# Patient Record
Sex: MALE | Race: WHITE | NOT HISPANIC OR LATINO | Employment: FULL TIME | URBAN - METROPOLITAN AREA
[De-identification: names, ages, dates, MRNs, and addresses within clinical notes are randomized per-mention and may not be internally consistent; named-entity substitution may affect disease eponyms.]

---

## 2023-02-21 ENCOUNTER — OFFICE VISIT (OUTPATIENT)
Dept: INTERNAL MEDICINE | Facility: CLINIC | Age: 23
End: 2023-02-21
Payer: COMMERCIAL

## 2023-02-21 ENCOUNTER — LAB (OUTPATIENT)
Dept: LAB | Facility: HOSPITAL | Age: 23
End: 2023-02-21
Payer: COMMERCIAL

## 2023-02-21 VITALS
HEIGHT: 67 IN | SYSTOLIC BLOOD PRESSURE: 118 MMHG | WEIGHT: 164.2 LBS | DIASTOLIC BLOOD PRESSURE: 80 MMHG | OXYGEN SATURATION: 95 % | TEMPERATURE: 97.7 F | HEART RATE: 55 BPM | BODY MASS INDEX: 25.77 KG/M2

## 2023-02-21 DIAGNOSIS — Z11.59 ENCOUNTER FOR HEPATITIS C SCREENING TEST FOR LOW RISK PATIENT: ICD-10-CM

## 2023-02-21 DIAGNOSIS — Z83.3 FAMILY HISTORY OF DIABETES MELLITUS: ICD-10-CM

## 2023-02-21 DIAGNOSIS — Z13.220 SCREENING FOR LIPID DISORDERS: ICD-10-CM

## 2023-02-21 DIAGNOSIS — N44.00 TESTICULAR TORSION: ICD-10-CM

## 2023-02-21 DIAGNOSIS — Z83.438 FAMILY HISTORY OF HYPERLIPIDEMIA: ICD-10-CM

## 2023-02-21 DIAGNOSIS — Z76.89 ESTABLISHING CARE WITH NEW DOCTOR, ENCOUNTER FOR: ICD-10-CM

## 2023-02-21 DIAGNOSIS — Z13.1 SCREENING FOR DIABETES MELLITUS: ICD-10-CM

## 2023-02-21 DIAGNOSIS — R41.840 DIFFICULTY CONCENTRATING: ICD-10-CM

## 2023-02-21 DIAGNOSIS — F17.220 CHEWING TOBACCO NICOTINE DEPENDENCE WITHOUT COMPLICATION: ICD-10-CM

## 2023-02-21 DIAGNOSIS — Z82.49 FAMILY HISTORY OF ESSENTIAL HYPERTENSION: ICD-10-CM

## 2023-02-21 DIAGNOSIS — Z13.29 SCREENING FOR THYROID DISORDER: ICD-10-CM

## 2023-02-21 DIAGNOSIS — E66.3 OVERWEIGHT: ICD-10-CM

## 2023-02-21 DIAGNOSIS — Z72.0 ENGAGES IN NICOTINE CONTAINING SUBSTANCE VAPING: ICD-10-CM

## 2023-02-21 DIAGNOSIS — Z23 NEED FOR INFLUENZA VACCINATION: ICD-10-CM

## 2023-02-21 DIAGNOSIS — Z71.3 ENCOUNTER FOR DIETARY COUNSELING AND SURVEILLANCE: ICD-10-CM

## 2023-02-21 DIAGNOSIS — F98.8 ATTENTION DEFICIT DISORDER, UNSPECIFIED HYPERACTIVITY PRESENCE: ICD-10-CM

## 2023-02-21 DIAGNOSIS — Z76.89 ESTABLISHING CARE WITH NEW DOCTOR, ENCOUNTER FOR: Primary | ICD-10-CM

## 2023-02-21 DIAGNOSIS — L05.91 PILONIDAL CYST OF NATAL CLEFT: ICD-10-CM

## 2023-02-21 LAB
ALBUMIN SERPL-MCNC: 5.2 G/DL (ref 3.5–5.2)
ALBUMIN/GLOB SERPL: 1.9 G/DL
ALP SERPL-CCNC: 103 U/L (ref 39–117)
ALT SERPL W P-5'-P-CCNC: 23 U/L (ref 1–41)
ANION GAP SERPL CALCULATED.3IONS-SCNC: 11.4 MMOL/L (ref 5–15)
AST SERPL-CCNC: 24 U/L (ref 1–40)
BILIRUB SERPL-MCNC: 1 MG/DL (ref 0–1.2)
BILIRUB UR QL STRIP: NEGATIVE
BUN SERPL-MCNC: 10 MG/DL (ref 6–20)
BUN/CREAT SERPL: 8.8 (ref 7–25)
CALCIUM SPEC-SCNC: 9.8 MG/DL (ref 8.6–10.5)
CHLORIDE SERPL-SCNC: 103 MMOL/L (ref 98–107)
CHOLEST SERPL-MCNC: 145 MG/DL (ref 0–200)
CLARITY UR: CLEAR
CO2 SERPL-SCNC: 24.6 MMOL/L (ref 22–29)
COLOR UR: YELLOW
CREAT SERPL-MCNC: 1.13 MG/DL (ref 0.76–1.27)
DEPRECATED RDW RBC AUTO: 39.1 FL (ref 37–54)
EGFRCR SERPLBLD CKD-EPI 2021: 93.1 ML/MIN/1.73
ERYTHROCYTE [DISTWIDTH] IN BLOOD BY AUTOMATED COUNT: 11.8 % (ref 12.3–15.4)
GLOBULIN UR ELPH-MCNC: 2.7 GM/DL
GLUCOSE SERPL-MCNC: 81 MG/DL (ref 65–99)
GLUCOSE UR STRIP-MCNC: NEGATIVE MG/DL
HBA1C MFR BLD: 5 % (ref 4.8–5.6)
HCT VFR BLD AUTO: 44.8 % (ref 37.5–51)
HDLC SERPL-MCNC: 60 MG/DL (ref 40–60)
HGB BLD-MCNC: 15.2 G/DL (ref 13–17.7)
HGB UR QL STRIP.AUTO: NEGATIVE
KETONES UR QL STRIP: NEGATIVE
LDLC SERPL CALC-MCNC: 73 MG/DL (ref 0–100)
LDLC/HDLC SERPL: 1.23 {RATIO}
LEUKOCYTE ESTERASE UR QL STRIP.AUTO: NEGATIVE
MCH RBC QN AUTO: 30.9 PG (ref 26.6–33)
MCHC RBC AUTO-ENTMCNC: 33.9 G/DL (ref 31.5–35.7)
MCV RBC AUTO: 91.1 FL (ref 79–97)
NITRITE UR QL STRIP: NEGATIVE
PH UR STRIP.AUTO: 6.5 [PH] (ref 5–8)
PLATELET # BLD AUTO: 244 10*3/MM3 (ref 140–450)
PMV BLD AUTO: 10.4 FL (ref 6–12)
POTASSIUM SERPL-SCNC: 4.2 MMOL/L (ref 3.5–5.2)
PROT SERPL-MCNC: 7.9 G/DL (ref 6–8.5)
PROT UR QL STRIP: NEGATIVE
RBC # BLD AUTO: 4.92 10*6/MM3 (ref 4.14–5.8)
SODIUM SERPL-SCNC: 139 MMOL/L (ref 136–145)
SP GR UR STRIP: 1.02 (ref 1–1.03)
TRIGL SERPL-MCNC: 57 MG/DL (ref 0–150)
TSH SERPL DL<=0.05 MIU/L-ACNC: 1.18 UIU/ML (ref 0.27–4.2)
UROBILINOGEN UR QL STRIP: NORMAL
VLDLC SERPL-MCNC: 12 MG/DL (ref 5–40)
WBC NRBC COR # BLD: 5.81 10*3/MM3 (ref 3.4–10.8)

## 2023-02-21 PROCEDURE — 90471 IMMUNIZATION ADMIN: CPT | Performed by: NURSE PRACTITIONER

## 2023-02-21 PROCEDURE — 83036 HEMOGLOBIN GLYCOSYLATED A1C: CPT

## 2023-02-21 PROCEDURE — 80050 GENERAL HEALTH PANEL: CPT

## 2023-02-21 PROCEDURE — 86708 HEPATITIS A ANTIBODY: CPT

## 2023-02-21 PROCEDURE — 86709 HEPATITIS A IGM ANTIBODY: CPT

## 2023-02-21 PROCEDURE — 86803 HEPATITIS C AB TEST: CPT

## 2023-02-21 PROCEDURE — 86704 HEP B CORE ANTIBODY TOTAL: CPT

## 2023-02-21 PROCEDURE — 96127 BRIEF EMOTIONAL/BEHAV ASSMT: CPT | Performed by: NURSE PRACTITIONER

## 2023-02-21 PROCEDURE — 87340 HEPATITIS B SURFACE AG IA: CPT

## 2023-02-21 PROCEDURE — 81003 URINALYSIS AUTO W/O SCOPE: CPT

## 2023-02-21 PROCEDURE — 90686 IIV4 VACC NO PRSV 0.5 ML IM: CPT | Performed by: NURSE PRACTITIONER

## 2023-02-21 PROCEDURE — 99204 OFFICE O/P NEW MOD 45 MIN: CPT | Performed by: NURSE PRACTITIONER

## 2023-02-21 PROCEDURE — 80061 LIPID PANEL: CPT

## 2023-02-21 PROCEDURE — 86706 HEP B SURFACE ANTIBODY: CPT

## 2023-02-21 NOTE — PROGRESS NOTES
Office Note     Name: Lucy Bahena    : 2000     MRN: [id number]     Chief Complaint  Cyst and Establish Care    Subjective     History of Present Illness:  Lucy Bahena is a 23 y.o. male who presents today for establish care with new provider and concerns for ADD and pilonidal cyst    Patient is accompanied by his mother today during visit    Patient would appreciate a referral for his diagnosis of ADD.  He states that in the past he has been on medication for this and does have a previous diagnosis.  He did have to stop the medication as he did enter into the .  Mother does have a psychiatric records and can provide those as we need them.  He does report that he has trouble remembering a conversation that he would have with someone but will remember a few days later.  He does have difficulty with concentrating.  Denies that he has issues with task completion    Patient does have a history of a pilonidal cyst.  It does appear in our records that back in 2018 he was evaluated and treated for this.  He reports he has had this condition for about 6 years.  He has never had surgical operation on this.  It is mainly aggravated by movement.  He was supposed to have surgery to remove the pilonidal cyst but even in the OR the doctor decided not to do the surgery as the doctor stated she could not see it.  Denies any leakage at this time.  No drainage.  No redness.  He does report that there is a pinpoint hole in the area.  His mother has seen it as well and does report similar findings    Patient reports a history of testicular torsion.  They were able to save his testicle.  This was in     Significant family history includes hypertension, hyperlipidemia, diabetes    Patient states he has been fully vaccinated for COVID.  He does not have his COVID card with him but he will provide us this documentation    He is interested in the tetanus and flu shot    Patient does not follow any particular  "diet or exercise pattern but he is very active at his job as he is a     Patient denies any alcohol use.  Occasional marijuana use.  Patient does vape nicotine-based liquid.  It is nondisposable and will last him for months.  Patient also chews tobacco.  Again this is occasional and 1 can will last him a few months.    Review of Systems   Constitutional: Negative for fatigue and fever.   Respiratory: Negative for cough.    Cardiovascular: Negative for chest pain.   Musculoskeletal: Negative for arthralgias.   Skin: Positive for wound.   Allergic/Immunologic: Negative for immunocompromised state.   Neurological: Negative for headaches.   Psychiatric/Behavioral: Positive for decreased concentration. Negative for suicidal ideas. The patient is not nervous/anxious.        Past Medical History:   Diagnosis Date   • ADHD (attention deficit hyperactivity disorder)    • HL (hearing loss)        Past Surgical History:   Procedure Laterality Date   • TESTICLE TORSION REPAIR         Social History     Socioeconomic History   • Marital status: Single   Tobacco Use   • Smoking status: Never   • Smokeless tobacco: Current     Types: Chew   Substance and Sexual Activity   • Alcohol use: No   • Drug use: Never   • Sexual activity: Yes     Partners: Female     Birth control/protection: Condom       No current outpatient medications on file.    Objective     Vital Signs  /80   Pulse 55   Temp 97.7 °F (36.5 °C)   Ht 170.2 cm (67\")   Wt 74.5 kg (164 lb 3.2 oz)   SpO2 95%   BMI 25.72 kg/m²   Estimated body mass index is 25.72 kg/m² as calculated from the following:    Height as of this encounter: 170.2 cm (67\").    Weight as of this encounter: 74.5 kg (164 lb 3.2 oz).    BMI is >= 25 and <30. (Overweight) The following options were offered after discussion;: exercise counseling/recommendations and nutrition counseling/recommendations      PHQ-9 Depression Screening  Little interest or pleasure in doing things? 0-->not " at all   Feeling down, depressed, or hopeless? 0-->not at all   Trouble falling or staying asleep, or sleeping too much?     Feeling tired or having little energy?     Poor appetite or overeating?     Feeling bad about yourself - or that you are a failure or have let yourself or your family down?     Trouble concentrating on things, such as reading the newspaper or watching television?     Moving or speaking so slowly that other people could have noticed? Or the opposite - being so fidgety or restless that you have been moving around a lot more than usual?     Thoughts that you would be better off dead, or of hurting yourself in some way?     PHQ-9 Total Score 0   If you checked off any problems, how difficult have these problems made it for you to do your work, take care of things at home, or get along with other people?       PHQ-9 Total Score: 0    KARRIE-7  Feeling nervous, anxious or on edge: Not at all  Not being able to stop or control worrying: Not at all  Worrying too much about different things: Not at all  Trouble Relaxing: Not at all  Being so restless that it is hard to sit still: Not at all  Feeling afraid as if something awful might happen: Not at all  Becoming easily annoyed or irritable: Several days  KARRIE 7 Total Score: 1    Physical Exam  Vitals and nursing note reviewed.   Constitutional:       Appearance: Normal appearance.   HENT:      Head: Normocephalic and atraumatic.   Eyes:      Extraocular Movements: Extraocular movements intact.      Pupils: Pupils are equal, round, and reactive to light.   Cardiovascular:      Rate and Rhythm: Normal rate and regular rhythm.      Pulses: Normal pulses.           Radial pulses are 2+ on the right side and 2+ on the left side.        Posterior tibial pulses are 2+ on the right side and 2+ on the left side.      Heart sounds: Normal heart sounds, S1 normal and S2 normal.   Pulmonary:      Effort: Pulmonary effort is normal.      Breath sounds: Normal breath  sounds.   Abdominal:      General: Bowel sounds are normal.      Palpations: Abdomen is soft.   Musculoskeletal:         General: Normal range of motion.   Skin:     General: Skin is warm and dry.          Neurological:      Mental Status: He is alert and oriented to person, place, and time.   Psychiatric:         Mood and Affect: Mood normal.         Behavior: Behavior normal.         Thought Content: Thought content normal.         Judgment: Judgment normal.                 Assessment and Plan     Diagnoses and all orders for this visit:    1. Establishing care with new doctor, encounter for (Primary)  -     CBC (No Diff); Future  -     Comprehensive Metabolic Panel; Future  -     Urinalysis With Culture If Indicated -; Future    2. Attention deficit disorder, unspecified hyperactivity presence  -     Ambulatory Referral to Behavioral Health    3. Difficulty concentrating    4. Pilonidal cyst of  cleft  -     Ambulatory Referral to General Surgery    5. Testicular torsion    6. Screening for lipid disorders  -     Lipid Panel; Future    7. Screening for diabetes mellitus  -     Hemoglobin A1c; Future    8. Overweight  -     Hemoglobin A1c; Future    9. BMI 25.0-25.9,adult  -     Hemoglobin A1c; Future    10. Encounter for dietary counseling and surveillance    11. Screening for thyroid disorder  -     TSH Rfx On Abnormal To Free T4; Future    12. Encounter for hepatitis C screening test for low risk patient  -     Viral Hepatitis Screening and Diagnosis (HAV, HBV, HCV); Future    13. Need for influenza vaccination  -     FluLaval/Fluarix/Fluzone >6 Months    14. Family history of essential hypertension    15. Family history of hyperlipidemia    16. Family history of diabetes mellitus    17. Engages in nicotine containing substance vaping    18. Chewing tobacco nicotine dependence without complication    Plan  Regarding the evaluation for your ADD and difficulty with concentration, we will place a referral to  behavioral health.  This will be virtual and based out of [place].  You will receive a phone call with a date time and location for your first appointment    Please make sure to provide us with your psychiatric records from your first diagnosis of ADD    Regarding the pilonidal cyst, will place a referral to general surgery for further evaluation.  You will be called with a date time and location    Labs will be ordered and obtained today.  Will be notified of results    Patient will receive the flu shot today    Please provide us a copy of your COVID card so that we can document appropriately    Continue with healthy lifestyle    Consider vaping and chewing tobacco cessation    Go to ER if any condition worsens or severe    We will plan to follow-up in 4 weeks to review labs further, check on the referrals to behavioral health and surgery, tetanus shot    Follow Up  Return for 4 weeks for lab review and referral follow up, tetanus shot.    CHRIS Catalan    Part of this note may be an electronic transcription/translation of spoken language to printed text using the Dragon Dictation System.

## 2023-02-22 ENCOUNTER — TELEPHONE (OUTPATIENT)
Dept: INTERNAL MEDICINE | Facility: CLINIC | Age: 23
End: 2023-02-22
Payer: COMMERCIAL

## 2023-02-22 NOTE — TELEPHONE ENCOUNTER
----- Message from CHRIS Catalan sent at 2/22/2023  8:41 AM EST -----  Please let patient know that labs resulted.  We are only waiting on the hepatitis panel.  Overall your labs look great!  We will still plan to follow-up March 21

## 2023-02-23 ENCOUNTER — TELEPHONE (OUTPATIENT)
Dept: INTERNAL MEDICINE | Facility: CLINIC | Age: 23
End: 2023-02-23
Payer: COMMERCIAL

## 2023-02-23 LAB
HAV AB SER QL IA: POSITIVE
HAV IGM SERPL QL IA: NEGATIVE
HBV CORE AB SERPL QL IA: NEGATIVE
HBV SURFACE AB SER QL: REACTIVE
HBV SURFACE AG SERPL QL IA: NEGATIVE
HCV AB SERPL QL IA: NORMAL
HCV IGG SERPL QL IA: NON REACTIVE
IMP & REVIEW OF LAB RESULTS: ABNORMAL
LABORATORY COMMENT REPORT: ABNORMAL

## 2023-02-24 ENCOUNTER — TELEPHONE (OUTPATIENT)
Dept: INTERNAL MEDICINE | Facility: CLINIC | Age: 23
End: 2023-02-24
Payer: COMMERCIAL

## 2023-02-24 ENCOUNTER — PATIENT ROUNDING (BHMG ONLY) (OUTPATIENT)
Dept: INTERNAL MEDICINE | Facility: CLINIC | Age: 23
End: 2023-02-24
Payer: COMMERCIAL

## 2023-02-24 NOTE — TELEPHONE ENCOUNTER
----- Message from CHRIS Catalan sent at 2/23/2023 12:00 PM EST -----  Please let patient know that hepatitis panel returned.  Hepatitis A antibody is positive but IgM is negative.  This means 1 of 2 things.  You have either had this condition in the past or been vaccinated for this condition.  Hepatitis A was big in our area a few years ago with multiple vaccine clinics.  It is very similar to a GI bug.  You may not have even known you at high  Your hepatitis B profile does show that you have immunity via vaccination  Hepatitis C is negative

## 2023-03-21 ENCOUNTER — OFFICE VISIT (OUTPATIENT)
Dept: INTERNAL MEDICINE | Facility: CLINIC | Age: 23
End: 2023-03-21
Payer: COMMERCIAL

## 2023-03-21 VITALS
BODY MASS INDEX: 25.11 KG/M2 | HEIGHT: 67 IN | HEART RATE: 57 BPM | OXYGEN SATURATION: 99 % | WEIGHT: 160 LBS | SYSTOLIC BLOOD PRESSURE: 132 MMHG | DIASTOLIC BLOOD PRESSURE: 80 MMHG | TEMPERATURE: 96.9 F

## 2023-03-21 DIAGNOSIS — F98.8 ATTENTION DEFICIT DISORDER, UNSPECIFIED HYPERACTIVITY PRESENCE: ICD-10-CM

## 2023-03-21 DIAGNOSIS — J30.89 SEASONAL ALLERGIC RHINITIS DUE TO OTHER ALLERGIC TRIGGER: ICD-10-CM

## 2023-03-21 DIAGNOSIS — Z01.89 ENCOUNTER FOR LABORATORY TEST: Primary | ICD-10-CM

## 2023-03-21 DIAGNOSIS — Z28.21 IMMUNIZATION DECLINED: ICD-10-CM

## 2023-03-21 DIAGNOSIS — L05.91 PILONIDAL CYST OF NATAL CLEFT: ICD-10-CM

## 2023-03-21 DIAGNOSIS — R41.840 DIFFICULTY CONCENTRATING: ICD-10-CM

## 2023-03-21 RX ORDER — DIPHENHYDRAMINE HCL 25 MG
25 TABLET ORAL NIGHTLY PRN
Qty: 30 TABLET | Refills: 0 | Status: SHIPPED | OUTPATIENT
Start: 2023-03-21 | End: 2023-04-20

## 2023-03-21 RX ORDER — CETIRIZINE HYDROCHLORIDE 10 MG/1
10 TABLET ORAL DAILY
Qty: 30 TABLET | Refills: 0 | Status: SHIPPED | OUTPATIENT
Start: 2023-03-21 | End: 2023-04-20

## 2023-03-21 RX ORDER — ALBUTEROL SULFATE 90 UG/1
2 AEROSOL, METERED RESPIRATORY (INHALATION) EVERY 4 HOURS PRN
Qty: 18 G | Refills: 0 | Status: SHIPPED | OUTPATIENT
Start: 2023-03-21 | End: 2023-04-20

## 2023-03-21 RX ORDER — FLUTICASONE PROPIONATE 50 MCG
2 SPRAY, SUSPENSION (ML) NASAL DAILY
Qty: 18.2 ML | Refills: 0 | Status: SHIPPED | OUTPATIENT
Start: 2023-03-21 | End: 2023-04-20
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
## 2023-05-16 ENCOUNTER — OFFICE VISIT (OUTPATIENT)
Dept: INTERNAL MEDICINE | Facility: CLINIC | Age: 23
End: 2023-05-16
Payer: COMMERCIAL

## 2023-05-16 VITALS
BODY MASS INDEX: 24.96 KG/M2 | OXYGEN SATURATION: 99 % | HEIGHT: 67 IN | DIASTOLIC BLOOD PRESSURE: 72 MMHG | HEART RATE: 52 BPM | WEIGHT: 159 LBS | SYSTOLIC BLOOD PRESSURE: 128 MMHG

## 2023-05-16 DIAGNOSIS — L05.91 PILONIDAL CYST OF NATAL CLEFT: ICD-10-CM

## 2023-05-16 DIAGNOSIS — Z00.00 ANNUAL PHYSICAL EXAM: Primary | ICD-10-CM

## 2023-05-16 DIAGNOSIS — Z71.3 ENCOUNTER FOR DIETARY COUNSELING AND SURVEILLANCE: ICD-10-CM

## 2023-05-16 DIAGNOSIS — N44.00 TESTICULAR TORSION: ICD-10-CM

## 2023-05-16 DIAGNOSIS — Z83.3 FAMILY HISTORY OF DIABETES MELLITUS: ICD-10-CM

## 2023-05-16 DIAGNOSIS — Z82.49 FAMILY HISTORY OF ESSENTIAL HYPERTENSION: ICD-10-CM

## 2023-05-16 DIAGNOSIS — F98.8 ATTENTION DEFICIT DISORDER, UNSPECIFIED HYPERACTIVITY PRESENCE: ICD-10-CM

## 2023-05-16 DIAGNOSIS — Z00.00 ENCOUNTER FOR WELL ADULT EXAM WITHOUT ABNORMAL FINDINGS: ICD-10-CM

## 2023-05-16 DIAGNOSIS — Z83.438 FAMILY HISTORY OF HYPERLIPIDEMIA: ICD-10-CM

## 2023-05-16 DIAGNOSIS — Z72.0 ENGAGES IN NICOTINE CONTAINING SUBSTANCE VAPING: ICD-10-CM

## 2023-05-16 DIAGNOSIS — R41.840 DIFFICULTY CONCENTRATING: ICD-10-CM

## 2023-05-16 DIAGNOSIS — Z23 ENCOUNTER FOR IMMUNIZATION: ICD-10-CM

## 2023-05-16 PROBLEM — F17.220 CHEWING TOBACCO NICOTINE DEPENDENCE WITHOUT COMPLICATION: Status: RESOLVED | Noted: 2023-02-21 | Resolved: 2023-05-16

## 2023-05-16 NOTE — PROGRESS NOTES
Office Note     Name: Lucy Bahena    : 2000     MRN: [id number]     Chief Complaint  Annual Exam    Subjective     History of Present Illness:  Lucy Bahena is a 23 y.o. male who presents today for annual physical exam    Patient establish care back in 2023    Patient does have a history of ADD.  He was referred to behavioral health but declined at this time.  He would like to keep the referral in the system just in case he would like to pursue in the future.  He does note difficulty with concentration.  Denies issues with task completion.  He does not want to be on medication at this time    Patient has a history of a pilonidal cyst.  At previous visit he was referred to a surgeon.  Records are noted in the patient's chart.  Denies any issues at this time.    Patient does have a history of testicular torsion in .  They were able to save his testicle    Significant family history does include hypertension, hyperlipidemia, diabetes    PHQ 2 was completed today with a score of 0    No falls or ER visits in the last year    Patient no longer chews tobacco.  He intermittently vapes nicotine-based liquid.  No excessive alcohol intake.  No excessive drug use    He does wear his seatbelts in the car and has smoke detectors at home    [facility]  He did let me know he has been diagnosed as 50% disabled.  He does have an additional upcoming appointment as well    The patient is being seen for a health maintenance evaluation.    Past Medical History:   Diagnosis Date   • ADHD (attention deficit hyperactivity disorder)    • Chewing tobacco nicotine dependence without complication 2023   • HL (hearing loss)        Past Surgical History:   Procedure Laterality Date   • TESTICLE TORSION REPAIR         Social History     Socioeconomic History   • Marital status: Single   Tobacco Use   • Smoking status: Never   • Smokeless tobacco:  "Current     Types: Chew   Substance and Sexual Activity   • Alcohol use: No   • Drug use: Never   • Sexual activity: Yes     Partners: Female     Birth control/protection: Condom         Current Outpatient Medications:   •  cetirizine (zyrTEC) 10 MG tablet, Take 1 tablet by mouth Daily for 30 days., Disp: 30 tablet, Rfl: 0  •  fluticasone (FLONASE) 50 MCG/ACT nasal spray, 2 sprays into the nostril(s) as directed by provider Daily for 30 days., Disp: 18.2 mL, Rfl: 0    General History  Lucy  does have regular dental visits.  He does not complain of vision problems. Last eye exam was none recent  Immunizations are up to date. The patient needs the following immunizations: He did provide a copy of his COVID card today.  Patient did receive his tetanus shot today as well    Lifestyle  Lucy  consumes a in general, a \"healthy\" diet  .  He exercises daily.    Reproductive Health  Lucy  is sexually active. His contraceptive plan is no method.   He does not have erectile dysfunction.     Screening  Last PSA was never.  Last prostate exam was never. Family history of prostate cancer: None stated  Last testicular exam was none recent     Last colonoscopy was never  Last Completed Colonoscopy     This patient has no relevant Health Maintenance data.      . Family history of colon cancer: None stated    Other pertinent family history and/or screenings: None at this time.    Advance Care Planning   ACP discussion was held with the patient during this visit. Patient does not have an advance directive, declines further assistance.           Objective     Vital Signs  /72   Pulse 52   Ht 170.2 cm (67\")   Wt 72.1 kg (159 lb)   SpO2 99%   BMI 24.90 kg/m²   Estimated body mass index is 24.9 kg/m² as calculated from the following:    Height as of this encounter: 170.2 cm (67\").    Weight as of this encounter: 72.1 kg (159 lb).    BMI is within normal parameters. No other follow-up for BMI required.      PHQ-9 Depression " Screening  Little interest or pleasure in doing things? 0-->not at all   Feeling down, depressed, or hopeless? 0-->not at all   Trouble falling or staying asleep, or sleeping too much?     Feeling tired or having little energy?     Poor appetite or overeating?     Feeling bad about yourself - or that you are a failure or have let yourself or your family down?     Trouble concentrating on things, such as reading the newspaper or watching television?     Moving or speaking so slowly that other people could have noticed? Or the opposite - being so fidgety or restless that you have been moving around a lot more than usual?     Thoughts that you would be better off dead, or of hurting yourself in some way?     PHQ-9 Total Score 0   If you checked off any problems, how difficult have these problems made it for you to do your work, take care of things at home, or get along with other people?       PHQ-9 Total Score: 0           Physical Exam  Vitals and nursing note reviewed.   Constitutional:       General: He is awake.      Appearance: Normal appearance. He is well-groomed.   HENT:      Head: Normocephalic and atraumatic.      Right Ear: Hearing and external ear normal.      Left Ear: Hearing and external ear normal.      Nose: Nose normal.      Mouth/Throat:      Lips: Pink.      Mouth: Mucous membranes are moist.   Eyes:      Extraocular Movements: Extraocular movements intact.      Pupils: Pupils are equal, round, and reactive to light.   Cardiovascular:      Rate and Rhythm: Normal rate and regular rhythm.      Pulses: Normal pulses.           Radial pulses are 2+ on the right side and 2+ on the left side.      Heart sounds: Normal heart sounds, S1 normal and S2 normal.   Pulmonary:      Effort: Pulmonary effort is normal.      Breath sounds: Normal breath sounds.   Abdominal:      General: Bowel sounds are normal.      Palpations: Abdomen is soft.   Musculoskeletal:         General: Normal range of motion.   Skin:      General: Skin is warm and dry.   Neurological:      Mental Status: He is alert and oriented to person, place, and time.   Psychiatric:         Mood and Affect: Mood normal.         Behavior: Behavior normal. Behavior is cooperative.         Thought Content: Thought content normal.         Judgment: Judgment normal.                 Assessment and Plan     Diagnoses and all orders for this visit:    1. Annual physical exam (Primary)    2. Encounter for well adult exam without abnormal findings    3. Attention deficit disorder, unspecified hyperactivity presence    4. Difficulty concentrating    5. Pilonidal cyst of  cleft    6. Testicular torsion    7. Family history of essential hypertension    8. Family history of hyperlipidemia    9. Family history of diabetes mellitus    10. Engages in nicotine containing substance vaping    11. BMI 24.0-24.9, adult    12. Encounter for dietary counseling and surveillance    13. Encounter for immunization    Other orders  -     Tdap Vaccine Greater Than or Equal To 6yo IM    Plan  Patient completed annual physical exam today    We will hold on labs at this time as they were obtained at previous visit and within normal limits.    Continue with healthy diet and exercise    Consider vaping cessation    Consider completing advanced directive    Tetanus shot was provided today.  Patient declined any further COVID vaccinations    Go to ER if any condition worsens or severe    He did not need refills today    Patient does feel comfortable waiting 1 year for next annual visit.  He is welcome to return as needed if acute needs arise.  Will obtain labs at next visit in 1 year    Follow Up  Return for 1 year for annual visit and labs.    CHRIS Catalan      Part of this note may be an electronic transcription/translation of spoken language to printed text using the Dragon Dictation System.

## 2024-01-15 ENCOUNTER — HOSPITAL ENCOUNTER (OUTPATIENT)
Facility: HOSPITAL | Age: 24
Setting detail: SURGERY ADMIT
End: 2024-01-15
Attending: UROLOGY | Admitting: UROLOGY
Payer: COMMERCIAL

## 2024-01-15 ENCOUNTER — APPOINTMENT (OUTPATIENT)
Dept: GENERAL RADIOLOGY | Facility: HOSPITAL | Age: 24
End: 2024-01-15
Payer: COMMERCIAL

## 2024-01-15 ENCOUNTER — ANESTHESIA (OUTPATIENT)
Dept: PERIOP | Facility: HOSPITAL | Age: 24
End: 2024-01-15
Payer: COMMERCIAL

## 2024-01-15 ENCOUNTER — APPOINTMENT (OUTPATIENT)
Dept: CT IMAGING | Facility: HOSPITAL | Age: 24
End: 2024-01-15
Payer: COMMERCIAL

## 2024-01-15 ENCOUNTER — READMISSION MANAGEMENT (OUTPATIENT)
Dept: CALL CENTER | Facility: HOSPITAL | Age: 24
End: 2024-01-15
Payer: COMMERCIAL

## 2024-01-15 ENCOUNTER — HOSPITAL ENCOUNTER (INPATIENT)
Facility: HOSPITAL | Age: 24
LOS: 1 days | Discharge: HOME | End: 2024-01-15
Attending: EMERGENCY MEDICINE | Admitting: HOSPITALIST
Payer: COMMERCIAL

## 2024-01-15 ENCOUNTER — ANESTHESIA EVENT (OUTPATIENT)
Dept: PERIOP | Facility: HOSPITAL | Age: 24
End: 2024-01-15
Payer: COMMERCIAL

## 2024-01-15 VITALS
HEIGHT: 67 IN | RESPIRATION RATE: 16 BRPM | WEIGHT: 157 LBS | OXYGEN SATURATION: 96 % | HEART RATE: 69 BPM | SYSTOLIC BLOOD PRESSURE: 142 MMHG | BODY MASS INDEX: 24.64 KG/M2 | TEMPERATURE: 97.5 F | DIASTOLIC BLOOD PRESSURE: 81 MMHG

## 2024-01-15 DIAGNOSIS — N20.0 BILATERAL KIDNEY STONES: Primary | ICD-10-CM

## 2024-01-15 DIAGNOSIS — N13.2 URETERAL STONE WITH HYDRONEPHROSIS: Primary | ICD-10-CM

## 2024-01-15 DIAGNOSIS — R31.9 HEMATURIA, UNSPECIFIED TYPE: ICD-10-CM

## 2024-01-15 DIAGNOSIS — N20.0 KIDNEY STONES: ICD-10-CM

## 2024-01-15 DIAGNOSIS — R10.9 ACUTE FLANK PAIN: ICD-10-CM

## 2024-01-15 LAB
ALBUMIN SERPL-MCNC: 4.7 G/DL (ref 3.5–5.2)
ALBUMIN/GLOB SERPL: 1.6 G/DL
ALP SERPL-CCNC: 97 U/L (ref 39–117)
ALT SERPL W P-5'-P-CCNC: 16 U/L (ref 1–41)
ANION GAP SERPL CALCULATED.3IONS-SCNC: 13 MMOL/L (ref 5–15)
AST SERPL-CCNC: 22 U/L (ref 1–40)
BACTERIA UR QL AUTO: ABNORMAL /HPF
BASOPHILS # BLD AUTO: 0.05 10*3/MM3 (ref 0–0.2)
BASOPHILS NFR BLD AUTO: 0.5 % (ref 0–1.5)
BILIRUB SERPL-MCNC: 0.6 MG/DL (ref 0–1.2)
BILIRUB UR QL STRIP: NEGATIVE
BUN SERPL-MCNC: 14 MG/DL (ref 6–20)
BUN/CREAT SERPL: 11.2 (ref 7–25)
CALCIUM SPEC-SCNC: 9 MG/DL (ref 8.6–10.5)
CHLORIDE SERPL-SCNC: 103 MMOL/L (ref 98–107)
CLARITY UR: CLEAR
CO2 SERPL-SCNC: 25 MMOL/L (ref 22–29)
COLOR UR: YELLOW
CREAT SERPL-MCNC: 1.25 MG/DL (ref 0.76–1.27)
DEPRECATED RDW RBC AUTO: 40.2 FL (ref 37–54)
EGFRCR SERPLBLD CKD-EPI 2021: 82.5 ML/MIN/1.73
EOSINOPHIL # BLD AUTO: 0.1 10*3/MM3 (ref 0–0.4)
EOSINOPHIL NFR BLD AUTO: 0.9 % (ref 0.3–6.2)
ERYTHROCYTE [DISTWIDTH] IN BLOOD BY AUTOMATED COUNT: 11.8 % (ref 12.3–15.4)
GLOBULIN UR ELPH-MCNC: 2.9 GM/DL
GLUCOSE SERPL-MCNC: 117 MG/DL (ref 65–99)
GLUCOSE UR STRIP-MCNC: NEGATIVE MG/DL
HCT VFR BLD AUTO: 44.8 % (ref 37.5–51)
HGB BLD-MCNC: 15 G/DL (ref 13–17.7)
HGB UR QL STRIP.AUTO: ABNORMAL
HOLD SPECIMEN: NORMAL
HYALINE CASTS UR QL AUTO: ABNORMAL /LPF
IMM GRANULOCYTES # BLD AUTO: 0.04 10*3/MM3 (ref 0–0.05)
IMM GRANULOCYTES NFR BLD AUTO: 0.4 % (ref 0–0.5)
KETONES UR QL STRIP: NEGATIVE
LEUKOCYTE ESTERASE UR QL STRIP.AUTO: NEGATIVE
LIPASE SERPL-CCNC: 37 U/L (ref 13–60)
LIPASE SERPL-CCNC: 37 U/L (ref 13–60)
LYMPHOCYTES # BLD AUTO: 1.07 10*3/MM3 (ref 0.7–3.1)
LYMPHOCYTES NFR BLD AUTO: 9.8 % (ref 19.6–45.3)
MCH RBC QN AUTO: 31.4 PG (ref 26.6–33)
MCHC RBC AUTO-ENTMCNC: 33.5 G/DL (ref 31.5–35.7)
MCV RBC AUTO: 93.9 FL (ref 79–97)
MONOCYTES # BLD AUTO: 0.71 10*3/MM3 (ref 0.1–0.9)
MONOCYTES NFR BLD AUTO: 6.5 % (ref 5–12)
NEUTROPHILS NFR BLD AUTO: 8.99 10*3/MM3 (ref 1.7–7)
NEUTROPHILS NFR BLD AUTO: 81.9 % (ref 42.7–76)
NITRITE UR QL STRIP: NEGATIVE
NRBC BLD AUTO-RTO: 0 /100 WBC (ref 0–0.2)
PH UR STRIP.AUTO: <=5 [PH] (ref 5–8)
PLATELET # BLD AUTO: 226 10*3/MM3 (ref 140–450)
PMV BLD AUTO: 10.1 FL (ref 6–12)
POTASSIUM SERPL-SCNC: 4 MMOL/L (ref 3.5–5.2)
PROT SERPL-MCNC: 7.6 G/DL (ref 6–8.5)
PROT UR QL STRIP: NEGATIVE
RBC # BLD AUTO: 4.77 10*6/MM3 (ref 4.14–5.8)
RBC # UR STRIP: ABNORMAL /HPF
REF LAB TEST METHOD: ABNORMAL
SODIUM SERPL-SCNC: 141 MMOL/L (ref 136–145)
SP GR UR STRIP: 1.01 (ref 1–1.03)
SQUAMOUS #/AREA URNS HPF: ABNORMAL /HPF
UROBILINOGEN UR QL STRIP: ABNORMAL
WBC # UR STRIP: ABNORMAL /HPF
WBC NRBC COR # BLD AUTO: 10.96 10*3/MM3 (ref 3.4–10.8)
WHOLE BLOOD HOLD COAG: NORMAL
WHOLE BLOOD HOLD SPECIMEN: NORMAL

## 2024-01-15 PROCEDURE — 76000 FLUOROSCOPY <1 HR PHYS/QHP: CPT

## 2024-01-15 PROCEDURE — C2617 STENT, NON-COR, TEM W/O DEL: HCPCS | Performed by: UROLOGY

## 2024-01-15 PROCEDURE — 83690 ASSAY OF LIPASE: CPT

## 2024-01-15 PROCEDURE — 52351 CYSTOURETERO & OR PYELOSCOPE: CPT | Performed by: UROLOGY

## 2024-01-15 PROCEDURE — 25010000002 ONDANSETRON PER 1 MG: Performed by: NURSE ANESTHETIST, CERTIFIED REGISTERED

## 2024-01-15 PROCEDURE — 85025 COMPLETE CBC W/AUTO DIFF WBC: CPT

## 2024-01-15 PROCEDURE — 74420 UROGRAPHY RTRGR +-KUB: CPT | Performed by: UROLOGY

## 2024-01-15 PROCEDURE — C1758 CATHETER, URETERAL: HCPCS | Performed by: UROLOGY

## 2024-01-15 PROCEDURE — 25010000002 PROPOFOL 10 MG/ML EMULSION: Performed by: NURSE ANESTHETIST, CERTIFIED REGISTERED

## 2024-01-15 PROCEDURE — 99024 POSTOP FOLLOW-UP VISIT: CPT | Performed by: UROLOGY

## 2024-01-15 PROCEDURE — 25010000002 KETOROLAC TROMETHAMINE PER 15 MG: Performed by: NURSE ANESTHETIST, CERTIFIED REGISTERED

## 2024-01-15 PROCEDURE — 25010000002 DEXAMETHASONE PER 1 MG: Performed by: NURSE ANESTHETIST, CERTIFIED REGISTERED

## 2024-01-15 PROCEDURE — 25810000003 LACTATED RINGERS PER 1000 ML: Performed by: UROLOGY

## 2024-01-15 PROCEDURE — 99222 1ST HOSP IP/OBS MODERATE 55: CPT | Performed by: UROLOGY

## 2024-01-15 PROCEDURE — 83690 ASSAY OF LIPASE: CPT | Performed by: EMERGENCY MEDICINE

## 2024-01-15 PROCEDURE — 74176 CT ABD & PELVIS W/O CONTRAST: CPT

## 2024-01-15 PROCEDURE — 25510000001 IOPAMIDOL 61 % SOLUTION: Performed by: UROLOGY

## 2024-01-15 PROCEDURE — 81001 URINALYSIS AUTO W/SCOPE: CPT

## 2024-01-15 PROCEDURE — C1769 GUIDE WIRE: HCPCS | Performed by: UROLOGY

## 2024-01-15 PROCEDURE — 25010000002 FENTANYL CITRATE (PF) 100 MCG/2ML SOLUTION: Performed by: NURSE ANESTHETIST, CERTIFIED REGISTERED

## 2024-01-15 PROCEDURE — 99285 EMERGENCY DEPT VISIT HI MDM: CPT

## 2024-01-15 PROCEDURE — 80053 COMPREHEN METABOLIC PANEL: CPT

## 2024-01-15 PROCEDURE — 52332 CYSTOSCOPY AND TREATMENT: CPT | Performed by: UROLOGY

## 2024-01-15 PROCEDURE — 25010000002 CEFAZOLIN PER 500 MG: Performed by: UROLOGY

## 2024-01-15 PROCEDURE — 99214 OFFICE O/P EST MOD 30 MIN: CPT | Performed by: HOSPITALIST

## 2024-01-15 DEVICE — URETERAL STENT
Type: IMPLANTABLE DEVICE | Site: URETER | Status: FUNCTIONAL
Brand: PERCUFLEX™ PLUS

## 2024-01-15 RX ORDER — SODIUM CHLORIDE 9 MG/ML
40 INJECTION, SOLUTION INTRAVENOUS AS NEEDED
Status: DISCONTINUED | OUTPATIENT
Start: 2024-01-15 | End: 2024-01-15 | Stop reason: HOSPADM

## 2024-01-15 RX ORDER — KETOROLAC TROMETHAMINE 10 MG/1
10 TABLET, FILM COATED ORAL EVERY 6 HOURS PRN
Status: ON HOLD | COMMUNITY
End: 2024-01-15 | Stop reason: SDUPTHER

## 2024-01-15 RX ORDER — ONDANSETRON 4 MG/1
4 TABLET, ORALLY DISINTEGRATING ORAL EVERY 6 HOURS PRN
Status: DISCONTINUED | OUTPATIENT
Start: 2024-01-15 | End: 2024-01-15 | Stop reason: HOSPADM

## 2024-01-15 RX ORDER — ACETAMINOPHEN 325 MG/1
650 TABLET ORAL EVERY 4 HOURS PRN
Status: DISCONTINUED | OUTPATIENT
Start: 2024-01-15 | End: 2024-01-15 | Stop reason: HOSPADM

## 2024-01-15 RX ORDER — SODIUM CHLORIDE 0.9 % (FLUSH) 0.9 %
10 SYRINGE (ML) INJECTION AS NEEDED
Status: DISCONTINUED | OUTPATIENT
Start: 2024-01-15 | End: 2024-01-15 | Stop reason: HOSPADM

## 2024-01-15 RX ORDER — DROPERIDOL 2.5 MG/ML
0.62 INJECTION, SOLUTION INTRAMUSCULAR; INTRAVENOUS ONCE AS NEEDED
Status: DISCONTINUED | OUTPATIENT
Start: 2024-01-15 | End: 2024-01-15 | Stop reason: HOSPADM

## 2024-01-15 RX ORDER — ALBUTEROL SULFATE 2.5 MG/3ML
2.5 SOLUTION RESPIRATORY (INHALATION) ONCE AS NEEDED
Status: DISCONTINUED | OUTPATIENT
Start: 2024-01-15 | End: 2024-01-15 | Stop reason: HOSPADM

## 2024-01-15 RX ORDER — LIDOCAINE HYDROCHLORIDE 10 MG/ML
INJECTION, SOLUTION EPIDURAL; INFILTRATION; INTRACAUDAL; PERINEURAL AS NEEDED
Status: DISCONTINUED | OUTPATIENT
Start: 2024-01-15 | End: 2024-01-15 | Stop reason: SURG

## 2024-01-15 RX ORDER — DEXAMETHASONE SODIUM PHOSPHATE 4 MG/ML
INJECTION, SOLUTION INTRA-ARTICULAR; INTRALESIONAL; INTRAMUSCULAR; INTRAVENOUS; SOFT TISSUE AS NEEDED
Status: DISCONTINUED | OUTPATIENT
Start: 2024-01-15 | End: 2024-01-15 | Stop reason: SURG

## 2024-01-15 RX ORDER — PROPOFOL 10 MG/ML
VIAL (ML) INTRAVENOUS AS NEEDED
Status: DISCONTINUED | OUTPATIENT
Start: 2024-01-15 | End: 2024-01-15 | Stop reason: SURG

## 2024-01-15 RX ORDER — SODIUM CHLORIDE 9 MG/ML
100 INJECTION, SOLUTION INTRAVENOUS CONTINUOUS
Status: DISCONTINUED | OUTPATIENT
Start: 2024-01-15 | End: 2024-01-15 | Stop reason: HOSPADM

## 2024-01-15 RX ORDER — FENTANYL CITRATE 50 UG/ML
50 INJECTION, SOLUTION INTRAMUSCULAR; INTRAVENOUS
Status: DISCONTINUED | OUTPATIENT
Start: 2024-01-15 | End: 2024-01-15 | Stop reason: HOSPADM

## 2024-01-15 RX ORDER — MEPERIDINE HYDROCHLORIDE 25 MG/ML
12.5 INJECTION INTRAMUSCULAR; INTRAVENOUS; SUBCUTANEOUS
Status: DISCONTINUED | OUTPATIENT
Start: 2024-01-15 | End: 2024-01-15 | Stop reason: HOSPADM

## 2024-01-15 RX ORDER — SODIUM CHLORIDE 9 MG/ML
10 INJECTION, SOLUTION INTRAMUSCULAR; INTRAVENOUS; SUBCUTANEOUS AS NEEDED
Status: DISCONTINUED | OUTPATIENT
Start: 2024-01-15 | End: 2024-01-15 | Stop reason: HOSPADM

## 2024-01-15 RX ORDER — TAMSULOSIN HYDROCHLORIDE 0.4 MG/1
1 CAPSULE ORAL EVERY EVENING
Status: ON HOLD | COMMUNITY
End: 2024-01-15 | Stop reason: SDUPTHER

## 2024-01-15 RX ORDER — ONDANSETRON 2 MG/ML
INJECTION INTRAMUSCULAR; INTRAVENOUS AS NEEDED
Status: DISCONTINUED | OUTPATIENT
Start: 2024-01-15 | End: 2024-01-15 | Stop reason: SURG

## 2024-01-15 RX ORDER — KETOROLAC TROMETHAMINE 30 MG/ML
30 INJECTION, SOLUTION INTRAMUSCULAR; INTRAVENOUS EVERY 6 HOURS PRN
Status: DISCONTINUED | OUTPATIENT
Start: 2024-01-15 | End: 2024-01-15 | Stop reason: HOSPADM

## 2024-01-15 RX ORDER — KETOROLAC TROMETHAMINE 30 MG/ML
INJECTION, SOLUTION INTRAMUSCULAR; INTRAVENOUS AS NEEDED
Status: DISCONTINUED | OUTPATIENT
Start: 2024-01-15 | End: 2024-01-15 | Stop reason: SURG

## 2024-01-15 RX ORDER — TAMSULOSIN HYDROCHLORIDE 0.4 MG/1
1 CAPSULE ORAL EVERY EVENING
Qty: 20 CAPSULE | Refills: 0 | Status: SHIPPED | OUTPATIENT
Start: 2024-01-15 | End: 2024-01-25 | Stop reason: SDUPTHER

## 2024-01-15 RX ORDER — SODIUM CHLORIDE, SODIUM LACTATE, POTASSIUM CHLORIDE, CALCIUM CHLORIDE 600; 310; 30; 20 MG/100ML; MG/100ML; MG/100ML; MG/100ML
9 INJECTION, SOLUTION INTRAVENOUS CONTINUOUS PRN
Status: DISCONTINUED | OUTPATIENT
Start: 2024-01-15 | End: 2024-01-15 | Stop reason: HOSPADM

## 2024-01-15 RX ORDER — ONDANSETRON 2 MG/ML
4 INJECTION INTRAMUSCULAR; INTRAVENOUS EVERY 6 HOURS PRN
Status: DISCONTINUED | OUTPATIENT
Start: 2024-01-15 | End: 2024-01-15 | Stop reason: HOSPADM

## 2024-01-15 RX ORDER — MAGNESIUM HYDROXIDE 1200 MG/15ML
LIQUID ORAL AS NEEDED
Status: DISCONTINUED | OUTPATIENT
Start: 2024-01-15 | End: 2024-01-15 | Stop reason: HOSPADM

## 2024-01-15 RX ORDER — NITROFURANTOIN 25; 75 MG/1; MG/1
100 CAPSULE ORAL 2 TIMES DAILY
Qty: 14 CAPSULE | Refills: 0 | Status: SHIPPED | OUTPATIENT
Start: 2024-01-15 | End: 2024-01-25 | Stop reason: SDUPTHER

## 2024-01-15 RX ORDER — FENTANYL CITRATE 50 UG/ML
INJECTION, SOLUTION INTRAMUSCULAR; INTRAVENOUS AS NEEDED
Status: DISCONTINUED | OUTPATIENT
Start: 2024-01-15 | End: 2024-01-15 | Stop reason: SURG

## 2024-01-15 RX ORDER — SODIUM CHLORIDE 0.9 % (FLUSH) 0.9 %
10 SYRINGE (ML) INJECTION EVERY 12 HOURS SCHEDULED
Status: DISCONTINUED | OUTPATIENT
Start: 2024-01-15 | End: 2024-01-15 | Stop reason: HOSPADM

## 2024-01-15 RX ORDER — PHENAZOPYRIDINE HYDROCHLORIDE 100 MG/1
200 TABLET, FILM COATED ORAL 3 TIMES DAILY PRN
Qty: 40 TABLET | Refills: 0 | Status: SHIPPED | OUTPATIENT
Start: 2024-01-15

## 2024-01-15 RX ORDER — KETOROLAC TROMETHAMINE 10 MG/1
10 TABLET, FILM COATED ORAL EVERY 6 HOURS PRN
Qty: 12 TABLET | Refills: 0 | Status: SHIPPED | OUTPATIENT
Start: 2024-01-15 | End: 2024-01-22 | Stop reason: SDUPTHER

## 2024-01-15 RX ORDER — OXYBUTYNIN CHLORIDE 10 MG/1
10 TABLET, EXTENDED RELEASE ORAL DAILY
Qty: 20 TABLET | Refills: 0 | Status: SHIPPED | OUTPATIENT
Start: 2024-01-15

## 2024-01-15 RX ORDER — FAMOTIDINE 20 MG/1
20 TABLET, FILM COATED ORAL
Status: COMPLETED | OUTPATIENT
Start: 2024-01-15 | End: 2024-01-15

## 2024-01-15 RX ORDER — ONDANSETRON 2 MG/ML
4 INJECTION INTRAMUSCULAR; INTRAVENOUS ONCE AS NEEDED
Status: DISCONTINUED | OUTPATIENT
Start: 2024-01-15 | End: 2024-01-15 | Stop reason: HOSPADM

## 2024-01-15 RX ORDER — HYDROMORPHONE HYDROCHLORIDE 1 MG/ML
0.5 INJECTION, SOLUTION INTRAMUSCULAR; INTRAVENOUS; SUBCUTANEOUS
Status: DISCONTINUED | OUTPATIENT
Start: 2024-01-15 | End: 2024-01-15 | Stop reason: HOSPADM

## 2024-01-15 RX ADMIN — PROPOFOL 80 MG: 10 INJECTION, EMULSION INTRAVENOUS at 15:26

## 2024-01-15 RX ADMIN — SODIUM CHLORIDE 2000 MG: 900 INJECTION INTRAVENOUS at 15:25

## 2024-01-15 RX ADMIN — KETOROLAC TROMETHAMINE 30 MG: 30 INJECTION, SOLUTION INTRAMUSCULAR at 15:42

## 2024-01-15 RX ADMIN — FAMOTIDINE 20 MG: 20 TABLET ORAL at 13:04

## 2024-01-15 RX ADMIN — DEXAMETHASONE SODIUM PHOSPHATE 8 MG: 4 INJECTION, SOLUTION INTRAMUSCULAR; INTRAVENOUS at 15:28

## 2024-01-15 RX ADMIN — FENTANYL CITRATE 100 MCG: 50 INJECTION, SOLUTION INTRAMUSCULAR; INTRAVENOUS at 15:24

## 2024-01-15 RX ADMIN — ONDANSETRON 4 MG: 2 INJECTION INTRAMUSCULAR; INTRAVENOUS at 15:28

## 2024-01-15 RX ADMIN — SODIUM CHLORIDE, POTASSIUM CHLORIDE, SODIUM LACTATE AND CALCIUM CHLORIDE 9 ML/HR: 600; 310; 30; 20 INJECTION, SOLUTION INTRAVENOUS at 13:04

## 2024-01-15 RX ADMIN — PROPOFOL 200 MG: 10 INJECTION, EMULSION INTRAVENOUS at 15:24

## 2024-01-15 RX ADMIN — LIDOCAINE HYDROCHLORIDE 50 MG: 10 INJECTION, SOLUTION EPIDURAL; INFILTRATION; INTRACAUDAL; PERINEURAL at 15:24

## 2024-01-15 NOTE — ANESTHESIA POSTPROCEDURE EVALUATION
Patient: Lucy Bahena    Procedure Summary       Date: 01/15/24 Room / Location:  [facility] /  [facility]    Anesthesia Start: 1519 Anesthesia Stop: 1549    Procedure: CYSTOSCOPY URETERAL STENT INSERTION (Bilateral: Bladder) Diagnosis:     Surgeons: Mike Tyson MD Provider: Gama Abdul MD    Anesthesia Type: general ASA Status: 2            Anesthesia Type: general    Vitals  Vitals Value Taken Time   BP     Temp     Pulse 63 01/15/24 1549   Resp     SpO2 95 % 01/15/24 1549   Vitals shown include unfiled device data.        Post Anesthesia Care and Evaluation    Patient location during evaluation: PACU  Patient participation: complete - patient participated  Level of consciousness: awake  Pain score: 0  Pain management: adequate    Airway patency: patent  Anesthetic complications: No anesthetic complications  PONV Status: none  Cardiovascular status: acceptable and stable  Respiratory status: nasal cannula, unassisted, acceptable and spontaneous ventilation  Hydration status: acceptable

## 2024-01-15 NOTE — CASE MANAGEMENT/SOCIAL WORK
Discharge Planning Assessment  [facility]     Patient Name: Lucy Bahena  MRN: [id number]  Today's Date: 1/15/2024    Admit Date: 1/15/2024    Plan: Home   Discharge Needs Assessment       Row Name 01/15/24 1104       Living Environment    People in Home alone    Current Living Arrangements home    Potentially Unsafe Housing Conditions none    Primary Care Provided by self    Provides Primary Care For no one    Family Caregiver if Needed parent(s)    Family Caregiver Names Elda Bahena, mom    Quality of Family Relationships helpful;involved;supportive    Able to Return to Prior Arrangements yes       Transition Planning    Patient/Family Anticipates Transition to home    Patient/Family Anticipated Services at Transition        Discharge Needs Assessment    Equipment Currently Used at Home none    Concerns to be Addressed denies needs/concerns at this time    Discharge Coordination/Progress Lives in a house alone in [place], but can call on mom Elda if he needs anything. No DME, history of home health, or advanced directives.                   Discharge Plan       Row Name 01/15/24 110       Plan    Plan Home    Patient/Family in Agreement with Plan yes    Plan Comments I spoke with Mr Bahena at bedside.  Mr Bahena resides in a house in [place] alone, but can call on his mother Elda if he needs any assistance.  He does not use any DME, has not had home health in the past, and does not have any advanced directives.  His insurance is [employer], and he denies any issues or lapses in coverage. His PCP is Wanda Elias, and he gets his prescriptions filled at [facility] in [place].  At this time, there are no discharge needs.    Final Discharge Disposition Code 01 - home or self-care                  Continued Care and Services - Admitted Since 1/15/2024    Coordination has not been started for this encounter.          Demographic Summary    No documentation.                   Functional Status       Row Name 01/15/24 1104       Functional Status    Usual Activity Tolerance good    Current Activity Tolerance good       Functional Status, IADL    Medications independent    Meal Preparation independent    Housekeeping independent    Laundry independent    Shopping independent       Mental Status    General Appearance WDL WDL                   Psychosocial    No documentation.                  Abuse/Neglect    No documentation.                  Legal    No documentation.                  Substance Abuse    No documentation.                  Patient Forms    No documentation.                     Gretchen Matt RN

## 2024-01-15 NOTE — H&P
"    [facility] Medicine Services  SAME DAY ADMISSION & DISCHARGE    Patient Name: Lucy Bahena  : 2000  MRN: [id number]    Date of Admission and Discharge: 1/15/2024    Primary Care Physician: Wanda Elias APRN  Consults       Date and Time Order Name Status Description    1/15/2024 11:35 AM Inpatient Urology Consult              Subjective   Presentation and Brief Hospital Summary     Chief Complaint:  Kidney stones [N20.0]    Active Hospital Problems    Diagnosis  POA    **Kidney stones [N20.0]  Yes      Resolved Hospital Problems   No resolved problems to display.         HPI and Brief Hospital Course:  Lucy Bahena is a 23 y.o. male with no significant PMH who presents due to left flank pain x 3 days. [facility] stone measuring 2 mm, as well as another stone on the right but that one was not causing symptoms. Due to worsening symptoms he presented to [facility] ER today. CT A/P showed \"Bilateral ureteral stones with mild upstream hydroureteronephrosis, suggestive of obstruction bilaterally. On the left, the stone measures 4 mm and is located within the distal ureter, just proximal to the ureterovesicular junction. On the right, the stone measures 5 mm and is located in the proximal ureter, just distal to the ureteropelvic junction.\" Labs unremarkable aside from mild elevated WBC 10.96. UA with moderate blood, no evidence of infection. Urology/Dr. Tyson consulted and took patient to OR today 1/15/24 for cystoscopy with bilateral ureteral stent placement. Tolerated procedure well. Urology is okay with patient discharging home today. They have sent appropriate scripts (see discharge med rec below) and Urology office will call patient with follow up appointment date/time.     Review of Systems   Gen-no fevers, no chills  CV-no chest pain, no palpitations  Resp-no cough, no dyspnea  GI-no N/V currently, no abd pain  -left " flank pain    All other systems reviewed and are negative.    Personal History     Past Medical History:   Diagnosis Date    ADHD (attention deficit hyperactivity disorder)     Chewing tobacco nicotine dependence without complication 2/21/2023    HL (hearing loss)        Past Surgical History:   Procedure Laterality Date    TESTICLE TORSION REPAIR         Family History: family history includes Diabetes in his mother; Hyperlipidemia in his mother.     Social History:  reports that he has never smoked. His smokeless tobacco use includes chew. He reports that he does not drink alcohol and does not use drugs.    Allergies:  No Known Allergies    Objective   Objective Findings     Vital Signs:   Temp:  [97.8 °F (36.6 °C)-98.2 °F (36.8 °C)] 98.2 °F (36.8 °C)  Heart Rate:  [] 73  Resp:  [18-20] 18  BP: (122-158)/() 149/98        Physical Exam (if not performed and documented at time of presentation on same date):   Constitutional: Awake, alert  Eyes: PERRLA, sclerae anicteric, no conjunctival injection  HENT: NCAT, mucous membranes moist  Neck: Supple, no thyromegaly, no lymphadenopathy, trachea midline  Respiratory: Clear to auscultation bilaterally, nonlabored respirations   Cardiovascular: RRR, no murmurs, rubs, or gallops, palpable pedal pulses bilaterally  Gastrointestinal: Positive bowel sounds, soft, nontender, nondistended  Musculoskeletal: No bilateral ankle edema, no clubbing or cyanosis to extremities  Psychiatric: Appropriate affect, cooperative  Neurologic: Oriented x 3, strength symmetric in all extremities, Cranial Nerves grossly intact to confrontation, speech clear  Skin: No rashes      Results Reviewed:  I have personally reviewed current lab, radiology, and data and agree.    Results from last 7 days   Lab Units 01/15/24  0740   WBC 10*3/mm3 10.96*   HEMOGLOBIN g/dL 15.0   HEMATOCRIT % 44.8   PLATELETS 10*3/mm3 226     Results from last 7 days   Lab Units 01/15/24  0740   SODIUM mmol/L 141  "  POTASSIUM mmol/L 4.0   CHLORIDE mmol/L 103   CO2 mmol/L 25.0   BUN mg/dL 14   CREATININE mg/dL 1.25   GLUCOSE mg/dL 117*   CALCIUM mg/dL 9.0   ALK PHOS U/L 97   ALT (SGPT) U/L 16   AST (SGOT) U/L 22     No results found for: \"PHART\", \"PCO2\"  Imaging Results (Last 24 Hours)       Procedure Component Value Units Date/Time    CT Abdomen Pelvis Without Contrast [[id number]] Collected: 01/15/24 0916     Updated: 01/15/24 0932    Narrative:      CT ABDOMEN PELVIS WO CONTRAST    Date of Exam: 1/15/2024 8:53 AM EST    Indication: left flank/LLQ pain.    Comparison: None available.    Technique: Axial CT images were obtained of the abdomen and pelvis without the administration of contrast. Reconstructed coronal and sagittal images were also obtained. Automated exposure control and iterative construction methods were used.    Findings:    Lower Thorax: Lung bases are clear. Nodular opacity within the right lower lobe partially included within the field-of-view, without suspicious features and probably partly calcified.    Liver: No focal hepatic lesion on this noncontrast exam.    Gallbladder and bile ducts: Gallbladder is unremarkable. No biliary ductal dilatation.    Pancreas: No pancreatic duct dilation. No surrounding inflammation.    Spleen: Normal in size.    Adrenal glands: No discrete adrenal nodule.    Kidneys: 4 mm stone in the distal left ureter, just proximal to the ureterovesicular junction, with mild upstream hydroureteronephrosis. 5 mm stone in the proximal right ureter, just distal to the ureteropelvic junction, with mild upstream   hydroureteronephrosis. Additionally, there are bilateral solitary punctate nonobstructing renal stones. Kidneys are normal/symmetric in size.    Urinary bladder: Unremarkable without intraluminal stones.    Reproductive Organs: Unremarkable.    Stomach and Bowel: No evidence of bowel obstruction.    Lymph nodes: No pathologically enlarged lymph nodes.    Vessels: No abdominal " aortic aneurysm.    Peritoneum and retroperitoneum: No free air or free fluid.    Soft tissues: Unremarkable.    Osseous structures: No suspicious osseous lesions.      Impression:      Impression:    Bilateral ureteral stones with mild upstream hydroureteronephrosis, suggestive of obstruction bilaterally. On the left, the stone measures 4 mm and is located within the distal ureter, just proximal to the ureterovesicular junction. On the right, the   stone measures 5 mm and is located in the proximal ureter, just distal to the ureteropelvic junction.     Additionally, there are bilateral solitary punctate nonobstructing renal stones.       Electronically Signed: Gwyn Martino MD    1/15/2024 9:29 AM EST    Workstation ID: [id number]                Discharge Details        Discharge Medications        New Medications        Instructions Start Date   nitrofurantoin (macrocrystal-monohydrate) 100 MG capsule  Commonly known as: Macrobid   100 mg, Oral, 2 Times Daily      oxybutynin XL 10 MG 24 hr tablet  Commonly known as: Ditropan XL   10 mg, Oral, Daily      phenazopyridine 100 MG tablet  Commonly known as: PYRIDIUM   200 mg, Oral, 3 Times Daily PRN             Changes to Medications        Instructions Start Date   ketorolac 10 MG tablet  Commonly known as: TORADOL  What changed: additional instructions   10 mg, Oral, Every 6 Hours PRN      tamsulosin 0.4 MG capsule 24 hr capsule  Commonly known as: FLOMAX  What changed: additional instructions   Take 1 capsule by mouth Every Evening For 7 days                 Discharge Disposition:  Home    Discharge Diet:  Regular    Discharge Activity:  As tolerated    CODE STATUS:   Code Status and Medical Interventions:   Ordered at: 01/15/24 1312     Code Status (Patient has no pulse and is not breathing):    CPR (Attempt to Resuscitate)     Medical Interventions (Patient has pulse or is breathing):    Full Support         Future Appointments   Date Time Provider Department Center    5/21/2024  9:15 AM Wanda Elias APRN [facility]         Time Spent on Discharge: I spent  15  minutes on this discharge activity which included: face-to-face encounter with the patient, reviewing the data in the system, coordination of the care with the nursing staff as well as consultants, documentation, and entering orders.      Angela Koehler MD  01/15/24

## 2024-01-15 NOTE — ANESTHESIA PREPROCEDURE EVALUATION
Anesthesia Evaluation     Patient summary reviewed and Nursing notes reviewed   no history of anesthetic complications:   NPO Solid Status: > 8 hours  NPO Liquid Status: > 8 hours           Airway   Mallampati: II  TM distance: >3 FB  Neck ROM: full  No difficulty expected  Dental      Pulmonary - negative pulmonary ROS and normal exam   Cardiovascular - negative cardio ROS and normal exam        Neuro/Psych  (+) psychiatric history  GI/Hepatic/Renal/Endo    (+) renal disease-    Musculoskeletal     Abdominal    Substance History      OB/GYN          Other                    Anesthesia Plan    ASA 2     general     intravenous induction     Anesthetic plan, risks, benefits, and alternatives have been provided, discussed and informed consent has been obtained with: patient.    Plan discussed with CRNA.    CODE STATUS:

## 2024-01-15 NOTE — CONSULTS
[facility]rology Hospital Consult Note     Date of Consult: 01/15/2024     Patient Name: Lucy Bahena  MRN: [id number]   : 2000     Referring Provider: * No referring provider recorded for this case *    Care Team: Patient Care Team:  Wanda Elias APRN as PCP - General (Nurse Practitioner)     Reason for Hospitalization: Left flank pain    History of Present Illness: Was contacted for hospital consultation on Lucy Bahena a 23 y.o. male has been having left flank and abdominal pain for the past couple of days [facility]  [facility] on the left side and discharged with pain medication for trial of passage.  Around 4 AM today he had significantly increasing pain on his left flank, nausea, hematuria prompting presentation to the ED.  He has not had any fever/chills.    On presentation patient was afebrile, hemodynamically stable.  Labs revealed WBC of 10.96, creatinine of 1.25, urinalysis with moderate blood but no bacteria on microscopy.  CT scan was obtained revealing left-sided distal ureteral stone with mild upstream hydroureteronephrosis, with additional right mid ureteral stone without any upstream hydro.  No additional stones seen. He is comfortable at this time.    Subjective      Pertinent items are noted in HPI.     Past Medical History:   Past Medical History:   Diagnosis Date    ADHD (attention deficit hyperactivity disorder)     Chewing tobacco nicotine dependence without complication 2023    HL (hearing loss)        Past Surgical History:   Past Surgical History:   Procedure Laterality Date    TESTICLE TORSION REPAIR         Family History:   Family History   Problem Relation Age of Onset    Diabetes Mother     Hyperlipidemia Mother        Social History:   Social History     Socioeconomic History    Marital status: Single   Tobacco Use    Smoking status: Never    Smokeless tobacco: Current     Types: Chew   Substance and  Sexual Activity    Alcohol use: No    Drug use: Never    Sexual activity: Yes     Partners: Female     Birth control/protection: Condom       Medications:     Current Facility-Administered Medications:     Sodium Chloride (PF) 0.9 % 10 mL, 10 mL, Intravenous, PRN, Emergency, Triage Protocol, MD    Current Outpatient Medications:     ketorolac (TORADOL) 10 MG tablet, Take 1 tablet by mouth Every 6 (Six) Hours As Needed for Moderate Pain. For 3 days, started on 24, Disp: , Rfl:     tamsulosin (FLOMAX) 0.4 MG capsule 24 hr capsule, Take 1 capsule by mouth Every Evening. For 7 days, started on 24, Disp: , Rfl:     Allergies:   No Known Allergies    Problem:   Patient Active Problem List   Diagnosis    Attention deficit disorder    Difficulty concentrating    Pilonidal cyst of jennie cleft    Testicular torsion    Family history of essential hypertension    Family history of hyperlipidemia    Family history of diabetes mellitus    Engages in nicotine containing substance vaping       Review of Systems   The following systems were reviewed and negative;  constitution, respiratory, cardiovascular, gastrointestinal, musculoskeletal, neurological, and behavioral/psych.   positive for left flank pain and hematuria      Objective     Physical Exam:  Vital Signs:   Temp:  [97.8 °F (36.6 °C)] 97.8 °F (36.6 °C)  Heart Rate:  [] 66  Resp:  [20] 20  BP: (122-158)/() 125/75  71.2 kg (157 lb)  Body mass index is 24.59 kg/m².       GEN: NAD, alert and oriented  HEENT: NCAT, EOMI  RESP: Equal bilateral chest rise, normal work of breathing  CV: Regular rate, appears well perfused  ABD: Soft, non-tender, non-distended  Ext: No gross deformities, normal ROM  MSK: Full ROM in the bilateral upper extremities  NEURO: No focal deficits  PSYCH: Normal mood and affect    Genitourinary  Left CVA tenderness present      No intake/output data recorded.    Labs  Lab Results   Component Value Date    GLUCOSE 117 (H)  "01/15/2024    CALCIUM 9.0 01/15/2024     01/15/2024    K 4.0 01/15/2024    CO2 25.0 01/15/2024     01/15/2024    BUN 14 01/15/2024    CREATININE 1.25 01/15/2024    BCR 11.2 01/15/2024    ANIONGAP 13.0 01/15/2024       Lab Results   Component Value Date    WBC 10.96 (H) 01/15/2024    HGB 15.0 01/15/2024    HCT 44.8 01/15/2024    MCV 93.9 01/15/2024     01/15/2024       No results found for: \"URINECX\"    Brief Urine Lab Results  (Last result in the past 365 days)        Color   Clarity   Blood   Leuk Est   Nitrite   Protein   CREAT   Urine HCG        01/15/24 0741 Yellow   Clear   Moderate (2+)   Negative   Negative   Negative                   Result Review    Result Review:  I have personally reviewed the results from the time of this admission to 1/15/2024 11:10 EST and agree with these findings:  [x]  Laboratory  [x]  Microbiology  [x]  Radiology  []  EKG/Telemetry   []  Cardiology/Vascular   []  Pathology  []  Old records  []  Other:  Most notable findings include: Labs notable for WBC of 10.96, creatinine of 1.25 (baseline around 1.1).  Urinalysis not concerning for infection with moderate blood, no nitrites, no bacteria.  CT scan reveals left-sided distal ureteral stone with upstream mild hydroureteronephrosis on the left.  Additional right mid ureteral stone without upstream dilation.  No additional renal stones identified.    Imaging Results (Last 72 Hours)       Procedure Component Value Units Date/Time    CT Abdomen Pelvis Without Contrast [[id number]] Collected: 01/15/24 0916     Updated: 01/15/24 0932    Narrative:      CT ABDOMEN PELVIS WO CONTRAST    Date of Exam: 1/15/2024 8:53 AM EST    Indication: left flank/LLQ pain.    Comparison: None available.    Technique: Axial CT images were obtained of the abdomen and pelvis without the administration of contrast. Reconstructed coronal and sagittal images were also obtained. Automated exposure control and iterative construction methods were " used.    Findings:    Lower Thorax: Lung bases are clear. Nodular opacity within the right lower lobe partially included within the field-of-view, without suspicious features and probably partly calcified.    Liver: No focal hepatic lesion on this noncontrast exam.    Gallbladder and bile ducts: Gallbladder is unremarkable. No biliary ductal dilatation.    Pancreas: No pancreatic duct dilation. No surrounding inflammation.    Spleen: Normal in size.    Adrenal glands: No discrete adrenal nodule.    Kidneys: 4 mm stone in the distal left ureter, just proximal to the ureterovesicular junction, with mild upstream hydroureteronephrosis. 5 mm stone in the proximal right ureter, just distal to the ureteropelvic junction, with mild upstream   hydroureteronephrosis. Additionally, there are bilateral solitary punctate nonobstructing renal stones. Kidneys are normal/symmetric in size.    Urinary bladder: Unremarkable without intraluminal stones.    Reproductive Organs: Unremarkable.    Stomach and Bowel: No evidence of bowel obstruction.    Lymph nodes: No pathologically enlarged lymph nodes.    Vessels: No abdominal aortic aneurysm.    Peritoneum and retroperitoneum: No free air or free fluid.    Soft tissues: Unremarkable.    Osseous structures: No suspicious osseous lesions.      Impression:      Impression:    Bilateral ureteral stones with mild upstream hydroureteronephrosis, suggestive of obstruction bilaterally. On the left, the stone measures 4 mm and is located within the distal ureter, just proximal to the ureterovesicular junction. On the right, the   stone measures 5 mm and is located in the proximal ureter, just distal to the ureteropelvic junction.     Additionally, there are bilateral solitary punctate nonobstructing renal stones.       Electronically Signed: Gwyn Martino MD    1/15/2024 9:29 AM EST    Workstation ID: [id number]            Assessment / Plan      Assessment/Plan:   Mr. Bahena is a 23-year-old  male without significant past medical history who was identified with kidney stones at an outside hospital on 1/12/2024.  At that time he was discharged with a trial of passage, and now presents to the [facility] ED due to refractory pain.  Patient does not have any infectious symptoms at this time, and lab work is reassuring.  CT scan does reveal bilateral ureteral stones, and therefore we discussed with the patient the need for emergent intervention due to concern for bilateral kidney obstruction.  We discussed the risks and benefits, and patient elected to proceed with cystoscopy and bilateral ureteral stent placement.    Plan:  - To OR for cystoscopy, bilateral ureteral stent placement  - NPO since 4AM  - Plan for discharge following procedure with outpatient follow up for definitive stone treatment    I discussed the patients findings and my recommendations with the patient.

## 2024-01-15 NOTE — Clinical Note
Level of Care: Med/Surg [1]   Diagnosis: Kidney stones [199100]   Certification: I Certify That Inpatient Hospital Services Are Medically Necessary For Greater Than 2 Midnights

## 2024-01-15 NOTE — OP NOTE
CYSTOSCOPY URETERAL CATHETER/STENT INSERTION  Procedure Report    Patient Name:  Lucy Bahena  YOB: 2000    Date of Surgery:  1/15/2024     Indications: 23-year-old male presenting with bilateral obstructing ureteral stones.  Given the bilateral nature of obstruction we have discussed the emergent indication for bilateral ureteral stent placement.  We have discussed the risk benefits alternatives and the patient elects proceed    Pre-op Diagnosis:   Bilateral ureteral stone       Post-Op Diagnosis Codes:  Bilateral ureteral stone      Procedure(s):  CYSTOSCOPY   RIGHT RETROGRADE PYELOGRAM  RIGHT URETERAL STENT INSERTION  LEFT RETROGRADE PYELOGRAM  LEFT URETERAL STENT INSERTION    Modifier 50-bilateral pyelography and stent placement    Staff:  Surgeon(s):  Mike Tyson MD      Anesthesia: General    Estimated Blood Loss: none    Implants:    Implant Name Type Inv. Item Serial No.  Lot No. LRB No. Used Action   STNT PERCUFLX NO GW 4.8X26 - [id number] Stent STNT PERCUFLX NO GW 4.8X26  [id number] Right 1 Implanted   STNT PERCUFLX NO GW 4.8X28 - [id number] Stent STNT PERCUFLX NO GW 4.8X28  [employer] NEIL [id number] Left 1 Implanted       Specimen:          None        Findings:   Normal urinary bladder.  Right retrograde pyelography with contrast seen filling mildly dilated collecting system.  Right ureteral stent placement  Left retrograde pyelography with contrast seen filling mildly dilated collecting system  Left ureteral stent placement    Complications: None immediate    Description of Procedure:   The patient was identified in the preoperative holding area where informed consent was reviewed and signed. The patient was transported the operating room per anesthesia and placed supine on the operating table. Smooth endotracheal intubation was performed without issue after administration of general anesthesia. The patient was then placed in the dorsal  lithotomy position where genitals were prepped and draped in the usual sterile fashion. A brief timeout was performed identifying the correct patient procedure and laterality. Perioperative antibiotics were administered. All pressure points were padded.     Procedure began by inserting a 22 Romanian cystoscope atraumatically per the patient's urethra, entering into the bladder were pan cystoscopy was performed identifying bilateral orthotopic ureteral orifices and no evidence of bladder masses or other lesions. Attention was then turned to the right ureteral orifice. A 5 Fr open ended ureteral catheter was inserted into the distal ureter and contrast dye was injected up the ureter, a retrograde pyelogram was performed identifying mild dilation of the collecting system, and the ureter and renal pelvis locations were identified under fluoroscopy. A Sensor wire was inserted through the ureteral catheter and advanced up the right ureteral orifice to the level of the renal pelvis on fluoroscopy. A 4.8 x 26 Romanian double J ureteral stent was then advanced over the wire to the renal pelvis confirmed on fluoroscopy. The Sensor wire was then removed and a good proximal stent curl was visualized within the collecting system on fluoroscopy, and a distal stent curl was observed within the bladder on direct visualization.     Attention was then turned to the left ureteral orifice. A 5 Fr open ended ureteral catheter was inserted into the distal ureter and contrast dye was injected up the ureter, a retrograde pyelogram was performed identifying mild dilation of the collecting system, and the ureter and renal pelvis locations were identified under fluoroscopy. A Sensor wire was inserted through the ureteral catheter and advanced up the left ureteral orifice to the level of the renal pelvis on fluoroscopy. A 4.8 x 26 Romanian double J ureteral stent was then advanced over the wire to the renal pelvis confirmed on fluoroscopy. The  Sensor wire was then removed and a good proximal stent curl was visualized within the collecting system on fluoroscopy, and a distal stent curl was observed within the bladder on direct visualization.     The patient's bladder was emptied. The cystoscope was then removed.    The patient was awoken from general anesthesia and transported to the PACU in stable condition.    PLAN  -The patient is appropriate for discharge from urologic standpoint.  He will need follow-up in approximately 1 week for cystoscopy, bilateral ureteroscopy laser lithotripsy.  Discharge medications placed.  Urology will contact after discharge to schedule operative intervention.            Mike Tyson MD     Date: 1/15/2024  Time: 15:54 EST

## 2024-01-15 NOTE — DISCHARGE INSTRUCTIONS
Stent Placement Post-Operative Care/Expectations    Follow these guidelines after your procedure in order to assist with your recovery.    Anesthesia Precautions and Expectations  - Rest for 24 hours after receiving general anesthesia, make sure you have someone at home with you that can monitor you  - Do not operate a vehicle, drink alcohol, or make 'important decisions'/sign legal documentation during the immediate recovery period if you received sedation for your procedure  - You may experience a sore throat, jaw discomfort, or muscle aches related to anesthesia, these symptoms may last a few days    Activity  - You may resume your normal home activities immediately post-operatively; however, light activity is encouraged for 24 hours to prevent urinary bleeding  - Do not operate a vehicle or drink alcohol if you were prescribed narcotic pain medications     Bathing/Showering  - You may resume normal bathing and showering post-procedure    Pain/Urinary Symptoms  - You may experience burning urinary pain for a few days, and/or increased urinary urgency/frequency post-procedure for a few weeks which is expected (sometimes longer if a ureteral stent was left in place)   - A medication to prevent burning urinary pain (Phenazopyridine) may be prescribed by your doctor, take as directed  - A medication to prevent urinary urgency/frequency (sometimes referred to as “bladder spasms”) (Hyoscyamine, Oxybutynin, Mirabegron, Solifenacin, etc.) may be prescribed by your doctor for up to 1 month, take as directed     Urinary Bleeding (Hematuria)   - Some degree of light urinary bleeding (hematuria) is expected for up to 1-2 weeks (this may be as light as pink lemonade or somewhat darker like clear/pale red Gatorade); a good rule of thumb is that your urine should remain see-through    - If you experience heavy urinary bleeding (like the color and consistency of tomato juice, or red wine), large blood clots, or you are unable to  urinate for more than 8 hours you should contact your doctor and present to the nearest Emergency Department  - Drink plenty of water at home and stay hydrated, as this will help naturally flush out your bladder and urethra    Antibiotics  - Complete the antibiotic course (if) prescribed as directed to prevent urinary infection     When to call your doctor:   - Pain that is not controlled with oral medications  - Signs of significant infection: Fever 101F, shaking chills, profuse sweating, persistent nausea or vomiting, unable to tolerate food or drink   - Severe urinary bleeding or large blood clots in urine  - Inability to urinate for more than 8 hours post-surgery    Urology surgery scheduler will call to coordinate your stone surgery in the coming weeks

## 2024-01-15 NOTE — ANESTHESIA PROCEDURE NOTES
Airway  Urgency: elective    Date/Time: 1/15/2024 3:25 PM  Airway not difficult    General Information and Staff    Patient location during procedure: OR  CRNA/CAA: Luis Smith CRNA    Indications and Patient Condition  Indications for airway management: airway protection    Preoxygenated: yes  Mask difficulty assessment: 0 - not attempted    Final Airway Details  Final airway type: supraglottic airway      Successful airway: I-gel  Size 4     Number of attempts at approach: 1  Assessment: lips, teeth, and gum same as pre-op    Additional Comments  LMA placed without difficulty, ventilation with assist, equal breath sounds and symmetric chest rise and fall

## 2024-01-15 NOTE — ED PROVIDER NOTES
Subjective   History of Present Illness  23-year-old male who presents with complaint of left flank pain and left lower quadrant abdominal pain.  The patient reports that he began to have the symptoms on Friday, 3 days ago.  [facility] with a kidney stone that was passing.  But also had multiple kidney stones in his kidneys at that Time.  He reports that he was given some pain medication upon being discharged.  He had the recent onset at 4 AM of increased pain in his left flank and left lower quadrant with associated hematuria and nausea.  This prompted current presentation to the ER.  He denies fever or infectious symptoms.  No chest pain, cough, shortness of breath.  No previous surgeries to the abdomen.  He has no pain at this given time as he was able to take and tolerate the pain medication that was previously prescribed to him.  No other acute complaints.      Review of Systems   Constitutional:  Negative for chills, fatigue and fever.   HENT:  Negative for congestion, ear pain, postnasal drip, sinus pressure and sore throat.    Eyes:  Negative for pain, redness and visual disturbance.   Respiratory:  Negative for cough, chest tightness and shortness of breath.    Cardiovascular:  Negative for chest pain, palpitations and leg swelling.   Gastrointestinal:  Positive for abdominal pain. Negative for anal bleeding, blood in stool, diarrhea, nausea and vomiting.   Endocrine: Negative for polydipsia and polyuria.   Genitourinary:  Positive for flank pain. Negative for difficulty urinating, dysuria, frequency and urgency.   Musculoskeletal:  Negative for arthralgias, back pain and neck pain.   Skin:  Negative for pallor and rash.   Allergic/Immunologic: Negative for environmental allergies and immunocompromised state.   Neurological:  Negative for dizziness, weakness and headaches.   Hematological:  Negative for adenopathy.   Psychiatric/Behavioral:  Negative  for confusion, self-injury and suicidal ideas. The patient is not nervous/anxious.    All other systems reviewed and are negative.      Past Medical History:   Diagnosis Date    ADHD (attention deficit hyperactivity disorder)     Chewing tobacco nicotine dependence without complication 2/21/2023    HL (hearing loss)        No Known Allergies    Past Surgical History:   Procedure Laterality Date    TESTICLE TORSION REPAIR         Family History   Problem Relation Age of Onset    Diabetes Mother     Hyperlipidemia Mother        Social History     Socioeconomic History    Marital status: Single   Tobacco Use    Smoking status: Never    Smokeless tobacco: Current     Types: Chew   Substance and Sexual Activity    Alcohol use: No    Drug use: Never    Sexual activity: Yes     Partners: Female     Birth control/protection: Condom           Objective   Physical Exam  Vitals and nursing note reviewed.   Constitutional:       General: He is not in acute distress.     Appearance: Normal appearance. He is well-developed. He is not toxic-appearing or diaphoretic.   HENT:      Head: Normocephalic and atraumatic.      Right Ear: External ear normal.      Left Ear: External ear normal.      Nose: Nose normal.   Eyes:      General: Lids are normal.      Pupils: Pupils are equal, round, and reactive to light.   Neck:      Trachea: No tracheal deviation.   Cardiovascular:      Rate and Rhythm: Normal rate and regular rhythm.      Pulses: No decreased pulses.      Heart sounds: Normal heart sounds. No murmur heard.     No friction rub. No gallop.   Pulmonary:      Effort: Pulmonary effort is normal. No respiratory distress.      Breath sounds: Normal breath sounds. No decreased breath sounds, wheezing, rhonchi or rales.   Abdominal:      General: Bowel sounds are normal.      Palpations: Abdomen is soft.      Tenderness: There is no abdominal tenderness in the left lower quadrant. There is left CVA tenderness. There is no guarding or  rebound.   Musculoskeletal:         General: No deformity. Normal range of motion.      Cervical back: Normal range of motion and neck supple.   Lymphadenopathy:      Cervical: No cervical adenopathy.   Skin:     General: Skin is warm and dry.      Findings: No rash.   Neurological:      Mental Status: He is alert and oriented to person, place, and time.      Cranial Nerves: No cranial nerve deficit.      Sensory: No sensory deficit.   Psychiatric:         Speech: Speech normal.         Behavior: Behavior normal.         Thought Content: Thought content normal.         Judgment: Judgment normal.         Procedures           ED Course  ED Course as of 01/15/24 1246   Mon Silas 15, 2024   1053 I spoke with Dr. Tyson. with urology. He is requesting admission by the hospitalist.  He will need to go to the operating room for stent placement bilaterally later today. [NS]      ED Course User Index  [NS] Toño Newby MD                                             Medical Decision Making  Differential diagnosis includes urinary tract infection, kidney stones, hematuria,    Urine shows blood but does not show signs of infection.    Labs show slightly elevated creatinine, relative to the patient's previous baseline, normal electrolytes.    White count is nonconcerning, normal H&H.    CT scan of the abdomen pelvis shows signs of bilateral obstructing kidney stones.  The patient has a left UVJ stone measuring at 4 mm and a right proximal ureteral stone measuring 5 mm.    I discussed these bilateral obstructing stones with the on-call urologist, Dr. Tyson, he has recommended admission for operative intervention.    I discussed the patient with the hospitalist, Dr. Koehler, who will consult for admission.    Problems Addressed:  Acute flank pain: complicated acute illness or injury with systemic symptoms  Bilateral kidney stones: complicated acute illness or injury with systemic symptoms that poses a threat to life or bodily  functions  Hematuria, unspecified type: complicated acute illness or injury with systemic symptoms    Amount and/or Complexity of Data Reviewed  Independent Historian: parent  External Data Reviewed: labs and radiology.  Labs: ordered. Decision-making details documented in ED Course.  Radiology: ordered and independent interpretation performed. Decision-making details documented in ED Course.    Risk  Decision regarding hospitalization.        Final diagnoses:   Bilateral kidney stones   Acute flank pain   Hematuria, unspecified type       ED Disposition  ED Disposition       ED Disposition   Decision to Admit    Condition   --    Comment   Level of Care: Med/Surg [1]   Diagnosis: Kidney stones [199100]   Bed Request Comments: 2F or 2G please                 No follow-up provider specified.       Medication List      No changes were made to your prescriptions during this visit.            Toño Newby MD  01/15/24 0257

## 2024-01-16 ENCOUNTER — TELEPHONE (OUTPATIENT)
Dept: UROLOGY | Facility: CLINIC | Age: 24
End: 2024-01-16

## 2024-01-16 ENCOUNTER — TRANSITIONAL CARE MANAGEMENT TELEPHONE ENCOUNTER (OUTPATIENT)
Dept: CALL CENTER | Facility: HOSPITAL | Age: 24
End: 2024-01-16
Payer: COMMERCIAL

## 2024-01-16 NOTE — OUTREACH NOTE
Call Center TCM Note      Flowsheet Row Responses   [facility] patient discharged from? [place]   Does the patient have one of the following disease processes/diagnoses(primary or secondary)? Other   TCM attempt successful? Yes  [verbal release for Elda and Darien Bahena, parents]   Call start time 1348   Call end time 1355   Discharge diagnosis Kidney stones   Person spoke with today (if not patient) and relationship mother Schroeder   Meds reviewed with patient/caregiver? Yes   Is the patient having any side effects they believe may be caused by any medication additions or changes? No   Does the patient have all medications ordered at discharge? Yes   Is the patient taking all medications as directed (includes completed medication regime)? Yes   Comments Hospital f/u on 1/22/24@415pm   Does the patient have an appointment with their PCP within 7-14 days of discharge? Yes   Has home health visited the patient within 72 hours of discharge? N/A   Psychosocial issues? No   Did the patient receive a copy of their discharge instructions? Yes   Nursing interventions Reviewed instructions with patient   What is the patient's perception of their health status since discharge? Improving  [Mother reports pt is doing well---taking meds as ordered, has f/u with urology next week for stent removal.  No fever,endorses some pain as well as hematuria but mother reports told to expect some with stent. Pt is drinking plenty of fluids.]   Is the patient/caregiver able to teach back signs and symptoms related to disease process for when to call PCP? Yes   Is the patient/caregiver able to teach back signs and symptoms related to disease process for when to call 911? Yes   TCM call completed? Yes   Call end time 1355            Aniyah Wilder RN    1/16/2024, 13:59 EST

## 2024-01-16 NOTE — OUTREACH NOTE
Prep Survey      Flowsheet Row Responses   [facility] patient discharged from? [place]   Is LACE score < 7 ? Yes   Eligibility [facility]   Date of Admission 01/15/24   Date of Discharge 01/15/24   Discharge Disposition Home or Self Care   Discharge diagnosis Kidney stones   Does the patient have one of the following disease processes/diagnoses(primary or secondary)? Other   Does the patient have Home health ordered? No   Is there a DME ordered? No   Prep survey completed? Yes            Gauri RENDON - Registered Nurse

## 2024-01-16 NOTE — DISCHARGE SUMMARY
"    [facility] Medicine Services  SAME DAY ADMISSION & DISCHARGE    Patient Name: Lucy Bahena  : 2000  MRN: [id number]    Date of Admission and Discharge: 1/15/2024    Primary Care Physician: Wanda Elias APRN  Consults       No orders found for last 30 day(s).            Subjective   Presentation and Brief Hospital Summary     Chief Complaint:  Kidney stones [N20.0]    Active Hospital Problems    Diagnosis  POA    **Kidney stones [N20.0]  Yes      Resolved Hospital Problems   No resolved problems to display.         HPI and Brief Hospital Course:  Lucy Bahena is a 23 y.o. male  with no significant PMH who presents due to left flank pain x 3 days. [facility] with left kidney stone measuring 2 mm, as well as another stone on the right but that one was not causing symptoms. Due to worsening symptoms he presented to [facility] ER today. CT A/P showed \"Bilateral ureteral stones with mild upstream hydroureteronephrosis, suggestive of obstruction bilaterally. On the left, the stone measures 4 mm and is located within the distal ureter, just proximal to the ureterovesicular junction. On the right, the stone measures 5 mm and is located in the proximal ureter, just distal to the ureteropelvic junction.\" Labs unremarkable aside from mild elevated WBC 10.96. UA with moderate blood, no evidence of infection. Urology/Dr. Tyson consulted and took patient to OR today 1/15/24 for cystoscopy with bilateral ureteral stent placement. Tolerated procedure well. Urology is okay with patient discharging home today. They have sent appropriate scripts (see discharge med rec below) and Urology office will call patient with follow up appointment date/time.        Review of Systems   Gen-no fevers, no chills  CV-no chest pain, no palpitations  Resp-no cough, no dyspnea  GI-no N/V currently, no abd pain  -left flank pain  All other systems reviewed and are " negative.    Personal History     Past Medical History:   Diagnosis Date    ADHD (attention deficit hyperactivity disorder)     Chewing tobacco nicotine dependence without complication 2/21/2023    HL (hearing loss)        Past Surgical History:   Procedure Laterality Date    TESTICLE TORSION REPAIR         Family History: family history includes Diabetes in his mother; Hyperlipidemia in his mother.     Social History:  reports that he has never smoked. His smokeless tobacco use includes chew. He reports that he does not drink alcohol and does not use drugs.    Allergies:  No Known Allergies    Objective   Objective Findings     Vital Signs:   Temp:  [97 °F (36.1 °C)-98.2 °F (36.8 °C)] 97.5 °F (36.4 °C)  Heart Rate:  [] 69  Resp:  [12-18] 16  BP: ()/() 142/81        Physical Exam (if not performed and documented at time of presentation on same date):   Constitutional: Awake, alert  Eyes: PERRLA, sclerae anicteric, no conjunctival injection  HENT: NCAT, mucous membranes moist  Neck: Supple, no thyromegaly, no lymphadenopathy, trachea midline  Respiratory: Clear to auscultation bilaterally, nonlabored respirations   Cardiovascular: RRR, no murmurs, rubs, or gallops, palpable pedal pulses bilaterally  Gastrointestinal: Positive bowel sounds, soft, nontender, nondistended  Musculoskeletal: No bilateral ankle edema, no clubbing or cyanosis to extremities  Psychiatric: Appropriate affect, cooperative  Neurologic: Oriented x 3, strength symmetric in all extremities, Cranial Nerves grossly intact to confrontation, speech clear  Skin: No rashes    Results Reviewed:  I have personally reviewed current lab, radiology, and data and agree.    Results from last 7 days   Lab Units 01/15/24  0740   WBC 10*3/mm3 10.96*   HEMOGLOBIN g/dL 15.0   HEMATOCRIT % 44.8   PLATELETS 10*3/mm3 226     Results from last 7 days   Lab Units 01/15/24  0740   SODIUM mmol/L 141   POTASSIUM mmol/L 4.0   CHLORIDE mmol/L 103   CO2  "mmol/L 25.0   BUN mg/dL 14   CREATININE mg/dL 1.25   GLUCOSE mg/dL 117*   CALCIUM mg/dL 9.0   ALK PHOS U/L 97   ALT (SGPT) U/L 16   AST (SGOT) U/L 22     No results found for: \"PHART\", \"PCO2\"  Imaging Results (Last 24 Hours)       Procedure Component Value Units Date/Time    FL C Arm During Surgery [[id number]] Resulted: 01/15/24 1542     Updated: 01/15/24 1542    Narrative:      This procedure was auto-finalized with no dictation required.    CT Abdomen Pelvis Without Contrast [[id number]] Collected: 01/15/24 0916     Updated: 01/15/24 0932    Narrative:      CT ABDOMEN PELVIS WO CONTRAST    Date of Exam: 1/15/2024 8:53 AM EST    Indication: left flank/LLQ pain.    Comparison: None available.    Technique: Axial CT images were obtained of the abdomen and pelvis without the administration of contrast. Reconstructed coronal and sagittal images were also obtained. Automated exposure control and iterative construction methods were used.    Findings:    Lower Thorax: Lung bases are clear. Nodular opacity within the right lower lobe partially included within the field-of-view, without suspicious features and probably partly calcified.    Liver: No focal hepatic lesion on this noncontrast exam.    Gallbladder and bile ducts: Gallbladder is unremarkable. No biliary ductal dilatation.    Pancreas: No pancreatic duct dilation. No surrounding inflammation.    Spleen: Normal in size.    Adrenal glands: No discrete adrenal nodule.    Kidneys: 4 mm stone in the distal left ureter, just proximal to the ureterovesicular junction, with mild upstream hydroureteronephrosis. 5 mm stone in the proximal right ureter, just distal to the ureteropelvic junction, with mild upstream   hydroureteronephrosis. Additionally, there are bilateral solitary punctate nonobstructing renal stones. Kidneys are normal/symmetric in size.    Urinary bladder: Unremarkable without intraluminal stones.    Reproductive Organs: Unremarkable.    Stomach and Bowel: " No evidence of bowel obstruction.    Lymph nodes: No pathologically enlarged lymph nodes.    Vessels: No abdominal aortic aneurysm.    Peritoneum and retroperitoneum: No free air or free fluid.    Soft tissues: Unremarkable.    Osseous structures: No suspicious osseous lesions.      Impression:      Impression:    Bilateral ureteral stones with mild upstream hydroureteronephrosis, suggestive of obstruction bilaterally. On the left, the stone measures 4 mm and is located within the distal ureter, just proximal to the ureterovesicular junction. On the right, the   stone measures 5 mm and is located in the proximal ureter, just distal to the ureteropelvic junction.     Additionally, there are bilateral solitary punctate nonobstructing renal stones.       Electronically Signed: Gwyn Martino MD    1/15/2024 9:29 AM EST    Workstation ID: [id number]                Discharge Details        Discharge Medications        New Medications        Instructions Start Date   nitrofurantoin (macrocrystal-monohydrate) 100 MG capsule  Commonly known as: Macrobid   100 mg, Oral, 2 Times Daily      oxybutynin XL 10 MG 24 hr tablet  Commonly known as: Ditropan XL   10 mg, Oral, Daily      phenazopyridine 100 MG tablet  Commonly known as: PYRIDIUM   200 mg, Oral, 3 Times Daily PRN             Changes to Medications        Instructions Start Date   ketorolac 10 MG tablet  Commonly known as: TORADOL  What changed: additional instructions   10 mg, Oral, Every 6 Hours PRN      tamsulosin 0.4 MG capsule 24 hr capsule  Commonly known as: FLOMAX  What changed: additional instructions   Take 1 capsule by mouth Every Evening For 7 days                 Discharge Disposition:  Home or Self Care    Discharge Diet:  Diet Instructions       Diet: Regular/House Diet; Regular Texture (IDDSI 7); Thin (IDDSI 0)      Discharge Diet: Regular/House Diet    Texture: Regular Texture (IDDSI 7)    Fluid Consistency: Thin (IDDSI 0)            Discharge  Activity:  Activity Instructions       Activity as Tolerated              CODE STATUS:   Code Status and Medical Interventions:   Ordered at: 01/15/24 1312     Code Status (Patient has no pulse and is not breathing):    CPR (Attempt to Resuscitate)     Medical Interventions (Patient has pulse or is breathing):    Full Support         Future Appointments   Date Time Provider Department Center   5/21/2024  9:15 AM Wanda Elias APRN [facility]       Additional Instructions for the Follow-ups that You Need to Schedule       Discharge Follow-up with PCP   As directed       Currently Documented PCP:    Wanda Elias APRN    PCP Phone Number:    [phone]     Follow Up Details: 1 week        Discharge Follow-up with Specified Provider: Urology office will call you with follow up appointment   As directed      To: Urology office will call you with follow up appointment                      Time Spent on Discharge: I spent  15  minutes on this discharge activity which included: face-to-face encounter with the patient, reviewing the data in the system, coordination of the care with the nursing staff as well as consultants, documentation, and entering orders.      Angela Koehler MD  01/16/24

## 2024-01-17 ENCOUNTER — TELEPHONE (OUTPATIENT)
Dept: UROLOGY | Facility: CLINIC | Age: 24
End: 2024-01-17
Payer: COMMERCIAL

## 2024-01-22 DIAGNOSIS — N20.0 KIDNEY STONES: ICD-10-CM

## 2024-01-24 RX ORDER — KETOROLAC TROMETHAMINE 10 MG/1
10 TABLET, FILM COATED ORAL EVERY 6 HOURS PRN
Qty: 12 TABLET | Refills: 0 | Status: SHIPPED | OUTPATIENT
Start: 2024-01-24 | End: 2024-01-27

## 2024-01-24 NOTE — TELEPHONE ENCOUNTER
Rx Refill Note  Requested Prescriptions     Pending Prescriptions Disp Refills    ketorolac (TORADOL) 10 MG tablet 12 tablet 0     Sig: Take 1 tablet by mouth Every 6 (Six) Hours As Needed for Moderate Pain for up to 3 days.      Last office visit with prescribing clinician: Visit date not found   Last telemedicine visit with prescribing clinician: Visit date not found   Next office visit with prescribing clinician: Visit date not found     PT requested refill but is having surgery tomorrow    Shantelle Elliott MA  01/24/24, 10:56 EST

## 2024-01-25 ENCOUNTER — DOCUMENTATION (OUTPATIENT)
Dept: UROLOGY | Facility: CLINIC | Age: 24
End: 2024-01-25

## 2024-01-25 ENCOUNTER — LAB REQUISITION (OUTPATIENT)
Dept: LAB | Facility: HOSPITAL | Age: 24
End: 2024-01-25
Payer: COMMERCIAL

## 2024-01-25 ENCOUNTER — OUTSIDE FACILITY SERVICE (OUTPATIENT)
Dept: UROLOGY | Facility: CLINIC | Age: 24
End: 2024-01-25
Payer: COMMERCIAL

## 2024-01-25 DIAGNOSIS — N13.2 HYDRONEPHROSIS WITH RENAL AND URETERAL CALCULOUS OBSTRUCTION: ICD-10-CM

## 2024-01-25 DIAGNOSIS — N13.2 URETERAL STONE WITH HYDRONEPHROSIS: Primary | ICD-10-CM

## 2024-01-25 PROCEDURE — 82365 CALCULUS SPECTROSCOPY: CPT | Performed by: UROLOGY

## 2024-01-25 RX ORDER — PHENAZOPYRIDINE HYDROCHLORIDE 200 MG/1
200 TABLET, FILM COATED ORAL 3 TIMES DAILY PRN
Qty: 20 TABLET | Refills: 0 | Status: SHIPPED | OUTPATIENT
Start: 2024-01-25

## 2024-01-25 RX ORDER — NITROFURANTOIN 25; 75 MG/1; MG/1
100 CAPSULE ORAL 2 TIMES DAILY
Qty: 14 CAPSULE | Refills: 0 | Status: SHIPPED | OUTPATIENT
Start: 2024-01-25

## 2024-01-25 RX ORDER — TAMSULOSIN HYDROCHLORIDE 0.4 MG/1
1 CAPSULE ORAL DAILY
Qty: 14 CAPSULE | Refills: 0 | Status: SHIPPED | OUTPATIENT
Start: 2024-01-25 | End: 2024-02-08

## 2024-01-25 RX ORDER — OXYBUTYNIN CHLORIDE 5 MG/1
5 TABLET, EXTENDED RELEASE ORAL DAILY
Qty: 14 TABLET | Refills: 0 | Status: SHIPPED | OUTPATIENT
Start: 2024-01-25

## 2024-01-25 NOTE — PROGRESS NOTES
[facility]   [facility] [place] [address]      OPERATIVE REPORT     Patient Name:  Lucy Bahena  YOB: 2000    Patient MRN: [id number]    Date of Surgery:  1/25/24     Indications: 24-year-old male presenting for bilateral ureteroscopy and laser lithotripsy secondary to bilateral ureteral stones.  He has undergone previous ureteral stent placement after presentation to [facility] ED with acute obstructing bilateral ureteral stone.  Presents today for definitive stone treatment.  Risk benefits alternatives discussed patient elects proceed    Pre-op Diagnosis:   Bilateral ureteral stone    Post-op Diagnosis:   Bilateral ureteral stone    Procedures Performed:  CYSTOSCOPY  LEFT URETEROSCOPY LASER LITHOTRIPSY, BASKET STONE EXTRACTION  LEFT RETROGRADE PYELOGRAM  LEFT URETERAL STENT PLACEMENT  RIGHT URETEROSCOPY LASER LITHOTRIPSY  RIGHT RETROGRADE PYELOGRAM  RIGHT URETERAL STENT PLACEMENT    MODIFIER 50- BILATERAL PROCEDURE    Staff:  Mike Tyson MD    Anesthesia: General    Estimated Blood Loss: Minimal    Implants:    LEFT: 4.8 Wolof by 28 cm ureteral stent  RIGHT: 4.8 Wolof by 26 cm ureteral stent    Specimen:  stone    Findings:   Normal urinary bladder  Left ureteroscopy with identification of 4 mm distal stone.  Stone was fragmented and removed with basket extraction.  Left Retrograde pyelography with mildly dilated collecting system noted.  Left ureteral stent placement  Right ureteroscopy with identification of proximal/renal pelvis stone.  Laser lithotripsy performed with complete dusting of stone.  Right retrograde pyelography with mildly dilated collecting system noted  Right ureteral stent placement    Complications: None immediate    Description of Procedure:    After informed consent, the patient was brought back to the operating suite and moved over to the operating table. General anesthesia was smoothly induced, IV antibiotics were administered, and the patient  was placed in the dorsal lithotomy position with careful attention focused on padding all pressure points. The patient was prepped and draped in standard fashion. A timeout was performed to ensure the correct patient and procedure    A 22Fr cystoscope was used to cannulate the urethra. The urethra was of normal course and caliber.  Upon entering the bladder, pan-cystoscopy revealed no bladder abnormalities.  The bilateral ureteral orifices were visualized in their orthotopic positions.     A sensor wire was advanced up the left ureter and into the collecting system on fluoroscopy to serve as a safety wire which was clamped to the surgical drapes.  Indwelling ureteral stent was removed intact.    SEMIRIGID URETEROSCOPY  The semi-rigid ureteroscope was then advanced into the bladder. Theleft ureter was cannulated demonstrating 4 mm distal ureteral stone.  A 200 µm fiber was used the stone was fragmented and stone fragments were removed with a 1.9 basket.  The scope was then advanced to the distal mid proximal ureter ensuring clearance.      LEFT Retrograde Pyelogram Interpretation  A retrograde pyelogram was performed demonstrating normal course and caliber of the ureter, outlining the location of the renal pelvis and collecting system, with mild dilation of the collecting system noted..     LEFT CYSTO STENT PLACEMENT  We switched back to the rigid cystoscope which was reinserted over the existing guidewire. A 4.8F x 28cm double J ureteral stent was advanced up the left ureter under direct visualization which confirmed good curl in the bladder. Fluoroscopy confirmed good curl in the kidney. The bladder was drained and this concluded our procedure.    A sensor wire was advanced up the right ureter and into the collecting system on fluoroscopy to serve as a safety wire which was clamped to the surgical drapes.  Indwelling ureteral stent was removed intact.    SEMIRIGID URETEROSCOPY  The semi-rigid ureteroscope was then  advanced into the bladder. The right ureter was cannulated with no evidence of stone, stent placed in the proximal ureter with wire passage.  Seen on fluoroscopy.       NO ACCESS SHEATH  We then switched to pressure-bag irrigation and a flexible ureteroscope was advanced over the second guidewire into the renal pelvis under live fluoroscopy and the second guidewire was removed. Pan-pyeloscopy was then performed which revealed 5 mm stone in the renal pelvis.. A 200 micron Thulium laser fiber was then advanced through the ureteroscope. The stones were fragmented into tiny stone dust particles. Pan pyeloscopy was then performed which confirmed no significant stone fragments. The ureter was inspected as the flexible ureteroscope was removed and found to be free of any injuries or stone.      RIGHT Retrograde Pyelogram Interpretation  A retrograde pyelogram was performed demonstrating normal course and caliber of the ureter, outlining the location of the renal pelvis and collecting system, with mild dilation of the collecting system noted..     RIGHT CYSTO STENT PLACEMENT  We switched back to the rigid cystoscope which was reinserted over the existing guidewire. A 4.8F x 28cm double J ureteral stent was advanced up the left ureter under direct visualization which confirmed good curl in the bladder. Fluoroscopy confirmed good curl in the kidney. The bladder was drained and this concluded our procedure.    The patient was brought back to the PACU in stable condition. All scopes and instruments were in good working order at the end of the case. There were no complications.    Disposition/Follow Up: Patient may discharge remaining appropriate PACU criteria.  Patient will follow-up in 5 to 7 days for cystoscopy and bilateral stent removal.    Mike Tyson MD     Date: 1/25/2024  Time: 09:20 EST

## 2024-01-30 ENCOUNTER — PROCEDURE VISIT (OUTPATIENT)
Dept: UROLOGY | Facility: CLINIC | Age: 24
End: 2024-01-30
Payer: COMMERCIAL

## 2024-01-30 VITALS — OXYGEN SATURATION: 98 % | HEIGHT: 67 IN | BODY MASS INDEX: 24.64 KG/M2 | HEART RATE: 68 BPM | WEIGHT: 157 LBS

## 2024-01-30 DIAGNOSIS — N13.30 HYDRONEPHROSIS, UNSPECIFIED HYDRONEPHROSIS TYPE: Primary | ICD-10-CM

## 2024-01-30 NOTE — PROGRESS NOTES
Procedure: Flexible Cystoscopy with Stent Removal     Preoperative Diagnosis: Bilateral Ureteral Stone    Postoperative Diagnosis: Bilateral Ureteral Stone    Procedure performed: Cystoscopy with bilateral ureteral stent removal     Surgeon: Mike Tyson MD    Anesthesia: 2% Lidocaine Jelly    Indications: Lucy Bahena is a 24 y.o. year old male with a history of bilateral ureteral stone who underwent definitive stone treatment. A ureteral stent was left in place. The patient returns for planned ureteral stent removal today.     Procedure: Patient was taken to the urology procedure suite and prepped and draped in sterile fashion. A pre-procedural identification was performed. 10 cc of 2% lidocaine jelly was injected into the urethra. After adequate anesthesia, a lubricated flexible cystoscope was inserted into the urethra. The urethra appeared normal. The urinary sphincter was intact. The bladder was entered and 360 degree panendoscopy was performed revealing normal bladder anatomy, bilateral orthotopic ureteral orifices, and no concern for bladder stones, trabeculations, mucosal lesions/tumors, or divetrticuli. The ureteral stents were in good position emanating from the ureteral orifice.      The right and left ureteral stents were grasped with a pair of grasping forceps and was removed without difficulty. The stent was removed intact.     PLAN    1) Discharge home    2) Follow up: 4 weeks US

## 2024-02-03 LAB
CALCIUM OXALATE DIHYDRATE MFR STONE IR: 40 %
COLOR STONE: NORMAL
COM MFR STONE: 50 %
COMPN STONE: NORMAL
HYDROXYAPATITE: 10 %
LABORATORY COMMENT REPORT: NORMAL
LABORATORY COMMENT REPORT: NORMAL
Lab: NORMAL
Lab: NORMAL
PHOTO: NORMAL
SIZE STONE: NORMAL MM
SPEC SOURCE SUBJ: NORMAL
STONE ANALYSIS-IMP: NORMAL
WT STONE: 12 MG

## 2024-03-06 ENCOUNTER — HOSPITAL ENCOUNTER (OUTPATIENT)
Dept: ULTRASOUND IMAGING | Facility: HOSPITAL | Age: 24
Discharge: HOME | End: 2024-03-06
Admitting: UROLOGY
Payer: COMMERCIAL

## 2024-03-06 DIAGNOSIS — N13.30 HYDRONEPHROSIS, UNSPECIFIED HYDRONEPHROSIS TYPE: ICD-10-CM

## 2024-03-06 PROCEDURE — 76775 US EXAM ABDO BACK WALL LIM: CPT

## 2024-03-12 ENCOUNTER — OFFICE VISIT (OUTPATIENT)
Dept: UROLOGY | Facility: CLINIC | Age: 24
End: 2024-03-12
Payer: COMMERCIAL

## 2024-03-12 VITALS — HEIGHT: 67 IN | BODY MASS INDEX: 24.33 KG/M2 | HEART RATE: 59 BPM | WEIGHT: 155 LBS | OXYGEN SATURATION: 98 %

## 2024-03-12 DIAGNOSIS — N20.0 NEPHROLITHIASIS: Primary | ICD-10-CM

## 2024-03-12 NOTE — PROGRESS NOTES
Office Note Kidney Stone      Patient Name: Lucy Bahena  : 2000   MRN: [id number]     Chief Complaint: History of Nephrolithiasis/Ureterolithiasis     History of Present Illness: Lucy Bahena is a 24 y.o. male who presents today for 4-week follow-up after bilateral ureteroscopy and laser lithotripsy for bilateral ureteral stone.  He has undergone successful stent removal.    Turns today for 4-week follow-up with ultrasound.  He denies any flank pain, recurrence of stone related symptoms.  Denies lower urinary tract symptoms.        Stone analysis:  calcium phosphate        Subjective      Review of System: Review of Systems   Genitourinary:  Negative for decreased urine volume, difficulty urinating, dysuria, enuresis, flank pain, frequency, hematuria and urgency.        I have reviewed the ROS documented by my clinical staff, updated as appropriate and I agree. Mike Tyson MD    Past Medical History:   Past Medical History:   Diagnosis Date    ADHD (attention deficit hyperactivity disorder)     Chewing tobacco nicotine dependence without complication 2023    HL (hearing loss)        Past Surgical History:   Past Surgical History:   Procedure Laterality Date    CYSTOSCOPY W/ URETERAL STENT PLACEMENT Bilateral 1/15/2024    Procedure: CYSTOSCOPY URETERAL STENT INSERTION;  Surgeon: Mike Tyson MD;  Location: [facility];  Service: Urology;  Laterality: Bilateral;    TESTICLE TORSION REPAIR         Family History:   Family History   Problem Relation Age of Onset    Diabetes Mother     Hyperlipidemia Mother        Social History:   Social History     Socioeconomic History    Marital status: Single   Tobacco Use    Smoking status: Never     Passive exposure: Never    Smokeless tobacco: Current     Types: Chew   Vaping Use    Vaping status: Every Day    Substances: Nicotine   Substance and Sexual Activity    Alcohol use: No    Drug use: Never    Sexual activity: Yes     Partners: Female      "Birth control/protection: Condom       Medications:     Current Outpatient Medications:     nitrofurantoin, macrocrystal-monohydrate, (Macrobid) 100 MG capsule, Take 1 capsule by mouth 2 (Two) Times a Day. (Patient not taking: Reported on 3/12/2024), Disp: 14 capsule, Rfl: 0    oxybutynin XL (Ditropan XL) 10 MG 24 hr tablet, Take 1 tablet by mouth Daily. (Patient not taking: Reported on 3/12/2024), Disp: 20 tablet, Rfl: 0    oxybutynin XL (DITROPAN-XL) 5 MG 24 hr tablet, Take 1 tablet by mouth Daily. PRN BLADDER SPASM (Patient not taking: Reported on 3/12/2024), Disp: 14 tablet, Rfl: 0    phenazopyridine (PYRIDIUM) 100 MG tablet, Take 2 tablets by mouth 3 (Three) Times a Day As Needed for Bladder Spasms. (Patient not taking: Reported on 3/12/2024), Disp: 40 tablet, Rfl: 0    phenazopyridine (Pyridium) 200 MG tablet, Take 1 tablet by mouth 3 (Three) Times a Day As Needed for Bladder Spasms. (Patient not taking: Reported on 3/12/2024), Disp: 20 tablet, Rfl: 0    Allergies:   No Known Allergies    Objective     Physical Exam:   Vital Signs:   Vitals:    03/12/24 1026   Pulse: 59   SpO2: 98%   Weight: 70.3 kg (155 lb)   Height: 170.2 cm (67.01\")   PainSc: 0-No pain     Body mass index is 24.27 kg/m².     Physical Exam  Vitals and nursing note reviewed.   Constitutional:       Appearance: Normal appearance.   HENT:      Head: Normocephalic and atraumatic.   Cardiovascular:      Comments: Well perfused  Pulmonary:      Effort: Pulmonary effort is normal.   Abdominal:      General: Abdomen is flat.      Palpations: Abdomen is soft.   Musculoskeletal:         General: Normal range of motion.   Skin:     General: Skin is warm and dry.   Neurological:      General: No focal deficit present.      Mental Status: He is alert and oriented to person, place, and time. Mental status is at baseline.   Psychiatric:         Mood and Affect: Mood normal.         Behavior: Behavior normal.         Thought Content: Thought content normal. "         Judgment: Judgment normal.         Labs:   Brief Urine Lab Results  (Last result in the past 365 days)        Color   Clarity   Blood   Leuk Est   Nitrite   Protein   CREAT   Urine HCG        01/15/24 0741 Yellow   Clear   Moderate (2+)   Negative   Negative   Negative                        Lab Results   Component Value Date    GLUCOSE 117 (H) 01/15/2024    CALCIUM 9.0 01/15/2024     01/15/2024    K 4.0 01/15/2024    CO2 25.0 01/15/2024     01/15/2024    BUN 14 01/15/2024    CREATININE 1.25 01/15/2024    BCR 11.2 01/15/2024    ANIONGAP 13.0 01/15/2024       Lab Results   Component Value Date    WBC 10.96 (H) 01/15/2024    HGB 15.0 01/15/2024    HCT 44.8 01/15/2024    MCV 93.9 01/15/2024     01/15/2024         Images:   US Renal Bilateral    Result Date: 3/6/2024  Impression: Unremarkable renal ultrasound Electronically Signed: Devonte Soliz MD  3/6/2024 5:02 PM EST  Workstation ID: [id number]    CT Abdomen Pelvis Without Contrast    Result Date: 1/15/2024  Impression: Bilateral ureteral stones with mild upstream hydroureteronephrosis, suggestive of obstruction bilaterally. On the left, the stone measures 4 mm and is located within the distal ureter, just proximal to the ureterovesicular junction. On the right, the stone measures 5 mm and is located in the proximal ureter, just distal to the ureteropelvic junction. Additionally, there are bilateral solitary punctate nonobstructing renal stones. Electronically Signed: Gwyn Martino MD  1/15/2024 9:29 AM EST  Workstation ID: [id number]      Measures:   Tobacco:   Lucy Bahena  reports that he has never smoked. He has never been exposed to tobacco smoke. His smokeless tobacco use includes chew. I have educated him on the risk of diseases from using tobacco products.     Assessment / Plan      Assessment/Plan:   Lucy Bahena is a 24 y.o. male who presented today with nephrolithiasis/ureterolithiasis.  Returns today with renal ultrasound  which demonstrates resolution of any hydronephrosis, evidence of nephrolithiasis.  Today we have discussed conservative strategies to reduce stone risk in the future including increasing fluid intake to greater than 2-2 and half liters, decreasing sodium and animal protein, increasing citrate intake.  He will follow-up in 1 year for symptom check and with renal ultrasound.  Alert with any acute stone symptoms in the interim.    Diagnoses and all orders for this visit:    1. Nephrolithiasis (Primary)  -     US Renal Bilateral; Future             Follow Up:   Return in about 1 year (around 3/12/2025).    I spent approximately 30 minutes providing clinical care for this patient; including review of patient's chart and provider documentation, face to face time spent with patient in examination room (obtaining history, performing physical exam, discussing diagnosis and management options), placing orders, and completing patient documentation.     Mike Tyson MD  [facility]

## 2024-05-21 ENCOUNTER — LAB (OUTPATIENT)
Dept: LAB | Facility: HOSPITAL | Age: 24
End: 2024-05-21
Payer: COMMERCIAL

## 2024-05-21 ENCOUNTER — OFFICE VISIT (OUTPATIENT)
Dept: INTERNAL MEDICINE | Facility: CLINIC | Age: 24
End: 2024-05-21
Payer: COMMERCIAL

## 2024-05-21 VITALS
SYSTOLIC BLOOD PRESSURE: 122 MMHG | HEIGHT: 67 IN | WEIGHT: 153 LBS | TEMPERATURE: 97.6 F | HEART RATE: 56 BPM | RESPIRATION RATE: 20 BRPM | OXYGEN SATURATION: 100 % | DIASTOLIC BLOOD PRESSURE: 64 MMHG | BODY MASS INDEX: 24.01 KG/M2

## 2024-05-21 DIAGNOSIS — Z72.0 ENGAGES IN NICOTINE CONTAINING SUBSTANCE VAPING: ICD-10-CM

## 2024-05-21 DIAGNOSIS — Z71.3 ENCOUNTER FOR DIETARY COUNSELING AND SURVEILLANCE: ICD-10-CM

## 2024-05-21 DIAGNOSIS — N44.00 TESTICULAR TORSION: ICD-10-CM

## 2024-05-21 DIAGNOSIS — Z83.3 FAMILY HISTORY OF DIABETES MELLITUS: ICD-10-CM

## 2024-05-21 DIAGNOSIS — Z82.49 FAMILY HISTORY OF ESSENTIAL HYPERTENSION: ICD-10-CM

## 2024-05-21 DIAGNOSIS — F98.8 ATTENTION DEFICIT DISORDER, UNSPECIFIED HYPERACTIVITY PRESENCE: ICD-10-CM

## 2024-05-21 DIAGNOSIS — N20.0 NEPHROLITHIASIS: ICD-10-CM

## 2024-05-21 DIAGNOSIS — L05.91 PILONIDAL CYST OF NATAL CLEFT: ICD-10-CM

## 2024-05-21 DIAGNOSIS — Z83.438 FAMILY HISTORY OF HYPERLIPIDEMIA: ICD-10-CM

## 2024-05-21 DIAGNOSIS — Z00.00 ANNUAL PHYSICAL EXAM: Primary | ICD-10-CM

## 2024-05-21 DIAGNOSIS — Z00.00 ENCOUNTER FOR WELL ADULT EXAM WITHOUT ABNORMAL FINDINGS: ICD-10-CM

## 2024-05-21 DIAGNOSIS — N13.39 OTHER HYDRONEPHROSIS: ICD-10-CM

## 2024-05-21 DIAGNOSIS — Z13.220 SCREENING FOR LIPID DISORDERS: ICD-10-CM

## 2024-05-21 DIAGNOSIS — R41.840 DIFFICULTY CONCENTRATING: ICD-10-CM

## 2024-05-21 DIAGNOSIS — Z13.31 DEPRESSION SCREEN: ICD-10-CM

## 2024-05-21 LAB
ALBUMIN SERPL-MCNC: 4.4 G/DL (ref 3.5–5.2)
ALBUMIN/GLOB SERPL: 1.7 G/DL
ALP SERPL-CCNC: 89 U/L (ref 39–117)
ALT SERPL W P-5'-P-CCNC: 20 U/L (ref 1–41)
ANION GAP SERPL CALCULATED.3IONS-SCNC: 9.3 MMOL/L (ref 5–15)
AST SERPL-CCNC: 16 U/L (ref 1–40)
BACTERIA UR QL AUTO: NORMAL /HPF
BILIRUB SERPL-MCNC: 0.2 MG/DL (ref 0–1.2)
BILIRUB UR QL STRIP: NEGATIVE
BUN SERPL-MCNC: 11 MG/DL (ref 6–20)
BUN/CREAT SERPL: 12.8 (ref 7–25)
CALCIUM SPEC-SCNC: 9 MG/DL (ref 8.6–10.5)
CHLORIDE SERPL-SCNC: 107 MMOL/L (ref 98–107)
CHOLEST SERPL-MCNC: 124 MG/DL (ref 0–200)
CLARITY UR: CLEAR
CO2 SERPL-SCNC: 23.7 MMOL/L (ref 22–29)
COLOR UR: YELLOW
CREAT SERPL-MCNC: 0.86 MG/DL (ref 0.76–1.27)
DEPRECATED RDW RBC AUTO: 39 FL (ref 37–54)
EGFRCR SERPLBLD CKD-EPI 2021: 123.2 ML/MIN/1.73
ERYTHROCYTE [DISTWIDTH] IN BLOOD BY AUTOMATED COUNT: 11.8 % (ref 12.3–15.4)
GLOBULIN UR ELPH-MCNC: 2.6 GM/DL
GLUCOSE SERPL-MCNC: 93 MG/DL (ref 65–99)
GLUCOSE UR STRIP-MCNC: NEGATIVE MG/DL
HCT VFR BLD AUTO: 44.2 % (ref 37.5–51)
HDLC SERPL-MCNC: 54 MG/DL (ref 40–60)
HGB BLD-MCNC: 15 G/DL (ref 13–17.7)
HGB UR QL STRIP.AUTO: NEGATIVE
HOLD SPECIMEN: NORMAL
HYALINE CASTS UR QL AUTO: NORMAL /LPF
KETONES UR QL STRIP: NEGATIVE
LDLC SERPL CALC-MCNC: 60 MG/DL (ref 0–100)
LDLC/HDLC SERPL: 1.16 {RATIO}
LEUKOCYTE ESTERASE UR QL STRIP.AUTO: ABNORMAL
MCH RBC QN AUTO: 30.9 PG (ref 26.6–33)
MCHC RBC AUTO-ENTMCNC: 33.9 G/DL (ref 31.5–35.7)
MCV RBC AUTO: 91.1 FL (ref 79–97)
NITRITE UR QL STRIP: NEGATIVE
PH UR STRIP.AUTO: 6.5 [PH] (ref 5–8)
PLATELET # BLD AUTO: 222 10*3/MM3 (ref 140–450)
PMV BLD AUTO: 10.6 FL (ref 6–12)
POTASSIUM SERPL-SCNC: 5.4 MMOL/L (ref 3.5–5.2)
PROT SERPL-MCNC: 7 G/DL (ref 6–8.5)
PROT UR QL STRIP: NEGATIVE
RBC # BLD AUTO: 4.85 10*6/MM3 (ref 4.14–5.8)
RBC # UR STRIP: NORMAL /HPF
REF LAB TEST METHOD: NORMAL
SODIUM SERPL-SCNC: 140 MMOL/L (ref 136–145)
SP GR UR STRIP: 1.02 (ref 1–1.03)
SQUAMOUS #/AREA URNS HPF: NORMAL /HPF
TRIGL SERPL-MCNC: 36 MG/DL (ref 0–150)
UROBILINOGEN UR QL STRIP: ABNORMAL
VLDLC SERPL-MCNC: 10 MG/DL (ref 5–40)
WBC # UR STRIP: NORMAL /HPF
WBC NRBC COR # BLD AUTO: 5.81 10*3/MM3 (ref 3.4–10.8)

## 2024-05-21 PROCEDURE — 80053 COMPREHEN METABOLIC PANEL: CPT

## 2024-05-21 PROCEDURE — 80061 LIPID PANEL: CPT

## 2024-05-21 PROCEDURE — 85027 COMPLETE CBC AUTOMATED: CPT

## 2024-05-21 PROCEDURE — 81001 URINALYSIS AUTO W/SCOPE: CPT

## 2024-05-22 ENCOUNTER — TELEPHONE (OUTPATIENT)
Dept: INTERNAL MEDICINE | Facility: CLINIC | Age: 24
End: 2024-05-22
Payer: COMMERCIAL

## 2024-05-22 NOTE — TELEPHONE ENCOUNTER
Called and spoke to pt. Gave message from provider. Pt voiced understanding and appreciation    Wanda Elias APRN P [facility] Clinical Pool  Please let patient know labs resulted.  Minor abnormalities on urinalysis.  Continue to stay well-hydrated  Liver numbers and kidney numbers look great on comprehensive metabolic panel.  Potassium is just slightly elevated.  If you are on any sort of supplementation I would recommend holding off for about 2 to 4 weeks  No evidence of infection or anemia  Lipid panel also looks great meaning the fat in your blood is well-controlled.

## 2025-05-27 ENCOUNTER — LAB (OUTPATIENT)
Dept: LAB | Facility: HOSPITAL | Age: 25
End: 2025-05-27
Payer: COMMERCIAL

## 2025-05-27 ENCOUNTER — RESULTS FOLLOW-UP (OUTPATIENT)
Dept: LAB | Facility: HOSPITAL | Age: 25
End: 2025-05-27
Payer: COMMERCIAL

## 2025-05-27 ENCOUNTER — OFFICE VISIT (OUTPATIENT)
Dept: INTERNAL MEDICINE | Facility: CLINIC | Age: 25
End: 2025-05-27
Payer: COMMERCIAL

## 2025-05-27 VITALS
OXYGEN SATURATION: 99 % | HEART RATE: 64 BPM | WEIGHT: 170.4 LBS | SYSTOLIC BLOOD PRESSURE: 130 MMHG | BODY MASS INDEX: 26.74 KG/M2 | TEMPERATURE: 98 F | DIASTOLIC BLOOD PRESSURE: 86 MMHG | HEIGHT: 67 IN

## 2025-05-27 DIAGNOSIS — L05.91 PILONIDAL CYST OF NATAL CLEFT: ICD-10-CM

## 2025-05-27 DIAGNOSIS — Z00.00 ENCOUNTER FOR WELL ADULT EXAM WITHOUT ABNORMAL FINDINGS: ICD-10-CM

## 2025-05-27 DIAGNOSIS — Z13.220 SCREENING FOR LIPID DISORDERS: ICD-10-CM

## 2025-05-27 DIAGNOSIS — Z83.3 FAMILY HISTORY OF DIABETES MELLITUS: ICD-10-CM

## 2025-05-27 DIAGNOSIS — Z00.00 ANNUAL PHYSICAL EXAM: ICD-10-CM

## 2025-05-27 DIAGNOSIS — N44.00 TESTICULAR TORSION: ICD-10-CM

## 2025-05-27 DIAGNOSIS — N20.0 NEPHROLITHIASIS: ICD-10-CM

## 2025-05-27 DIAGNOSIS — Z00.00 ANNUAL PHYSICAL EXAM: Primary | ICD-10-CM

## 2025-05-27 DIAGNOSIS — N13.39 OTHER HYDRONEPHROSIS: ICD-10-CM

## 2025-05-27 DIAGNOSIS — Z13.31 DEPRESSION SCREEN: ICD-10-CM

## 2025-05-27 DIAGNOSIS — Z82.49 FAMILY HISTORY OF ESSENTIAL HYPERTENSION: ICD-10-CM

## 2025-05-27 DIAGNOSIS — Z83.438 FAMILY HISTORY OF HYPERLIPIDEMIA: ICD-10-CM

## 2025-05-27 LAB
ALBUMIN SERPL-MCNC: 4.7 G/DL (ref 3.5–5.2)
ALBUMIN/GLOB SERPL: 1.7 G/DL
ALP SERPL-CCNC: 104 U/L (ref 39–117)
ALT SERPL W P-5'-P-CCNC: 31 U/L (ref 1–41)
ANION GAP SERPL CALCULATED.3IONS-SCNC: 12.4 MMOL/L (ref 5–15)
AST SERPL-CCNC: 25 U/L (ref 1–40)
BILIRUB SERPL-MCNC: 0.5 MG/DL (ref 0–1.2)
BILIRUB UR QL STRIP: NEGATIVE
BUN SERPL-MCNC: 12 MG/DL (ref 6–20)
BUN/CREAT SERPL: 10.4 (ref 7–25)
CALCIUM SPEC-SCNC: 9.7 MG/DL (ref 8.6–10.5)
CHLORIDE SERPL-SCNC: 106 MMOL/L (ref 98–107)
CHOLEST SERPL-MCNC: 150 MG/DL (ref 0–200)
CLARITY UR: CLEAR
CO2 SERPL-SCNC: 24.6 MMOL/L (ref 22–29)
COLOR UR: YELLOW
CREAT SERPL-MCNC: 1.15 MG/DL (ref 0.76–1.27)
DEPRECATED RDW RBC AUTO: 39.7 FL (ref 37–54)
EGFRCR SERPLBLD CKD-EPI 2021: 90 ML/MIN/1.73
ERYTHROCYTE [DISTWIDTH] IN BLOOD BY AUTOMATED COUNT: 11.8 % (ref 12.3–15.4)
GLOBULIN UR ELPH-MCNC: 2.8 GM/DL
GLUCOSE SERPL-MCNC: 93 MG/DL (ref 65–99)
GLUCOSE UR STRIP-MCNC: NEGATIVE MG/DL
HCT VFR BLD AUTO: 45.2 % (ref 37.5–51)
HDLC SERPL-MCNC: 43 MG/DL (ref 40–60)
HGB BLD-MCNC: 15.2 G/DL (ref 13–17.7)
HGB UR QL STRIP.AUTO: NEGATIVE
HOLD SPECIMEN: NORMAL
KETONES UR QL STRIP: NEGATIVE
LDLC SERPL CALC-MCNC: 95 MG/DL (ref 0–100)
LDLC/HDLC SERPL: 2.22 {RATIO}
LEUKOCYTE ESTERASE UR QL STRIP.AUTO: NEGATIVE
MCH RBC QN AUTO: 30.6 PG (ref 26.6–33)
MCHC RBC AUTO-ENTMCNC: 33.6 G/DL (ref 31.5–35.7)
MCV RBC AUTO: 91.1 FL (ref 79–97)
NITRITE UR QL STRIP: NEGATIVE
PH UR STRIP.AUTO: 5.5 [PH] (ref 5–8)
PLATELET # BLD AUTO: 284 10*3/MM3 (ref 140–450)
PMV BLD AUTO: 10.4 FL (ref 6–12)
POTASSIUM SERPL-SCNC: 4.9 MMOL/L (ref 3.5–5.2)
PROT SERPL-MCNC: 7.5 G/DL (ref 6–8.5)
PROT UR QL STRIP: NEGATIVE
RBC # BLD AUTO: 4.96 10*6/MM3 (ref 4.14–5.8)
SODIUM SERPL-SCNC: 143 MMOL/L (ref 136–145)
SP GR UR STRIP: 1.02 (ref 1–1.03)
TRIGL SERPL-MCNC: 58 MG/DL (ref 0–150)
UROBILINOGEN UR QL STRIP: NORMAL
VLDLC SERPL-MCNC: 12 MG/DL (ref 5–40)
WBC NRBC COR # BLD AUTO: 5.33 10*3/MM3 (ref 3.4–10.8)

## 2025-05-27 PROCEDURE — 3079F DIAST BP 80-89 MM HG: CPT | Performed by: NURSE PRACTITIONER

## 2025-05-27 PROCEDURE — 3075F SYST BP GE 130 - 139MM HG: CPT | Performed by: NURSE PRACTITIONER

## 2025-05-27 PROCEDURE — 99395 PREV VISIT EST AGE 18-39: CPT | Performed by: NURSE PRACTITIONER

## 2025-05-27 PROCEDURE — 85027 COMPLETE CBC AUTOMATED: CPT

## 2025-05-27 PROCEDURE — 1126F AMNT PAIN NOTED NONE PRSNT: CPT | Performed by: NURSE PRACTITIONER

## 2025-05-27 PROCEDURE — 81003 URINALYSIS AUTO W/O SCOPE: CPT

## 2025-05-27 PROCEDURE — 96127 BRIEF EMOTIONAL/BEHAV ASSMT: CPT | Performed by: NURSE PRACTITIONER

## 2025-05-27 PROCEDURE — 80061 LIPID PANEL: CPT

## 2025-05-27 PROCEDURE — 1159F MED LIST DOCD IN RCRD: CPT | Performed by: NURSE PRACTITIONER

## 2025-05-27 PROCEDURE — 1160F RVW MEDS BY RX/DR IN RCRD: CPT | Performed by: NURSE PRACTITIONER

## 2025-05-27 PROCEDURE — 80053 COMPREHEN METABOLIC PANEL: CPT

## 2025-05-27 NOTE — PROGRESS NOTES
Office Note     Name: Lucy Bahena    : 2000     MRN: [id number]     Chief Complaint  Annual Exam    Subjective     History of Present Illness:  Lucy Bahena is a 25 y.o. male who presents today for annual physical exam    Patient is followed with urology in the past for hydronephrosis and kidney stones.  It appears he was supposed to have a follow-up visit in 2025 but did not complete this visit  -does not feel like needs a follow up. Recently passed a kidney stone    Patient does have noted history of ADD but not currently on medication    Patient also has noted history of pilonidal cysts    Patient did experience testicular torsion in .  They were able to save his testicle.    Family history includes hypertension, hyperlipidemia, diabetes    [facility]     Patient is currently on nicotine gum as he is trying to quit nicotine.  No increase in alcohol or drug use    Overall he is working to follow a healthy lifestyle.  He states that he is looking for a career change and did applied to the [employer] academy in [place]    The patient is being seen for a health maintenance evaluation.    Past Medical History:   Diagnosis Date    ADHD (attention deficit hyperactivity disorder)     Chewing tobacco nicotine dependence without complication 2023    HL (hearing loss)        Past Surgical History:   Procedure Laterality Date    CYSTOSCOPY W/ URETERAL STENT PLACEMENT Bilateral 1/15/2024    Procedure: CYSTOSCOPY URETERAL STENT INSERTION;  Surgeon: Mike Tyson MD;  Location: [facility];  Service: Urology;  Laterality: Bilateral;    TESTICLE TORSION REPAIR         Social History     Socioeconomic History    Marital status: Single   Tobacco Use    Smoking status: Never     Passive exposure: Never    Smokeless tobacco: Current     Types: Chew   Vaping Use    Vaping status: Every Day    Substances: Nicotine    Devices: Disposable    Passive  "vaping exposure: Yes   Substance and Sexual Activity    Alcohol use: No    Drug use: Never    Sexual activity: Yes     Partners: Female     Birth control/protection: Condom       No current outpatient medications on file.    General History  Lucy  does have regular dental visits.  He does not complain of vision problems. Last eye exam was - none recent  Immunizations are up to date. The patient needs the following immunizations: Up-to-date at this time    Lifestyle  Lucy  consumes a in general, a \"healthy\" diet  .  He exercises intermittently.    Reproductive Health  Lucy  is sexually active. His contraceptive plan is no method.   He does not have erectile dysfunction.     Screening  Last PSA was none per chart review.  Last prostate exam was none recently stated. Family history of prostate cancer: None stated  Last testicular exam was none recently stated.       Last colonoscopy was never  Last Completed Colonoscopy    This patient has no relevant Health Maintenance data.     . Family history of colon cancer: None stated      Other pertinent family history and/or screenings: None at this time    Advance Care Planning   ACP discussion was held with the patient during this visit. Patient does not have an advance directive, declines further assistance.       Review of Systems    Objective     Vital Signs  /86 (BP Location: Left arm, Patient Position: Sitting, Cuff Size: Adult)   Pulse 64   Temp 98 °F (36.7 °C)   Ht 170.2 cm (67\")   Wt 77.3 kg (170 lb 6.4 oz)   SpO2 99%   BMI 26.69 kg/m²   Estimated body mass index is 26.69 kg/m² as calculated from the following:    Height as of this encounter: 170.2 cm (67\").    Weight as of this encounter: 77.3 kg (170 lb 6.4 oz).    BMI is within normal parameters. No other follow-up for BMI required.      PHQ-9 Depression Screening  Little interest or pleasure in doing things? Not at all   Feeling down, depressed, or hopeless? Not at all   PHQ-2 Total Score 0 "   Trouble falling or staying asleep, or sleeping too much?     Feeling tired or having little energy?     Poor appetite or overeating?     Feeling bad about yourself - or that you are a failure or have let yourself or your family down?     Trouble concentrating on things, such as reading the newspaper or watching television?     Moving or speaking so slowly that other people could have noticed? Or the opposite - being so fidgety or restless that you have been moving around a lot more than usual?     Thoughts that you would be better off dead, or of hurting yourself in some way?     PHQ-9 Total Score     If you checked off any problems, how difficult have these problems made it for you to do your work, take care of things at home, or get along with other people? Not difficult at all     PHQ-9 Total Score:         Physical Exam  Vitals and nursing note reviewed.   Constitutional:       Appearance: Normal appearance.   HENT:      Head: Normocephalic and atraumatic.      Right Ear: Hearing, tympanic membrane, ear canal and external ear normal.      Left Ear: Hearing, tympanic membrane, ear canal and external ear normal.      Nose: Nose normal.      Mouth/Throat:      Lips: Pink.      Mouth: Mucous membranes are moist.      Pharynx: Oropharynx is clear.   Eyes:      Extraocular Movements: Extraocular movements intact.      Pupils: Pupils are equal, round, and reactive to light.   Cardiovascular:      Rate and Rhythm: Normal rate and regular rhythm.      Pulses: Normal pulses.           Radial pulses are 2+ on the right side and 2+ on the left side.        Posterior tibial pulses are 2+ on the right side and 2+ on the left side.      Heart sounds: Normal heart sounds, S1 normal and S2 normal.   Pulmonary:      Effort: Pulmonary effort is normal.      Breath sounds: Normal breath sounds.   Abdominal:      General: Bowel sounds are normal.      Palpations: Abdomen is soft.   Musculoskeletal:         General: Normal range of  motion.   Skin:     General: Skin is warm and dry.   Neurological:      Mental Status: He is alert and oriented to person, place, and time.      Comments: Mental status fully intact as patient was able to provide a detailed description of the events   Psychiatric:         Mood and Affect: Mood normal.         Behavior: Behavior normal.         Thought Content: Thought content normal.         Judgment: Judgment normal.               Assessment and Plan     Diagnoses and all orders for this visit:    1. Annual physical exam (Primary)  -     CBC (No Diff); Future  -     Comprehensive Metabolic Panel; Future  -     Lipid Panel; Future  -     Urinalysis With Culture If Indicated -; Future    2. Encounter for well adult exam without abnormal findings    3. Nephrolithiasis  -     CBC (No Diff); Future  -     Comprehensive Metabolic Panel; Future  -     Urinalysis With Culture If Indicated -; Future    4. Other hydronephrosis  -     CBC (No Diff); Future  -     Comprehensive Metabolic Panel; Future  -     Urinalysis With Culture If Indicated -; Future    5. Pilonidal cyst of jennie cleft    6. Testicular torsion    7. Family history of diabetes mellitus    8. Family history of essential hypertension    9. Family history of hyperlipidemia    10. Screening for lipid disorders  -     Lipid Panel; Future    11. BMI 26.0-26.9,adult    12. Depression screen    Plan  Annual physical exam completed with patient today    Continue to follow with urology as needed    No changes to family history    Continue to work towards nicotine cessation    Congratulations on working towards a career change    Labs are ordered and will be obtained today.  Patient will be notified of results.  Patient was notified I will be out of the office Friday and Monday    Go to ER if any condition worsens or severe    Return for annual in 1 year    Follow Up  Return for Annual physical.    CHRIS Catalan      Part of this note may be an electronic  transcription/translation of spoken language to printed text using the Dragon Dictation System.

## (undated) DEVICE — NITINOL WIRE WITH HYDROPHILIC TIP: Brand: SENSOR

## (undated) DEVICE — CATH URETRL FLXITP POLLACK STD 5F 70CM

## (undated) DEVICE — PK CYSTO-TUR BASIC 10

## (undated) DEVICE — GLV SURG BIOGEL LTX PF 7 1/2

## (undated) DEVICE — BLANKT WARM UPPR/BDY ARM/OUT 57X196CM